# Patient Record
Sex: FEMALE | Race: WHITE | Employment: OTHER | URBAN - METROPOLITAN AREA
[De-identification: names, ages, dates, MRNs, and addresses within clinical notes are randomized per-mention and may not be internally consistent; named-entity substitution may affect disease eponyms.]

---

## 2018-07-19 ENCOUNTER — APPOINTMENT (OUTPATIENT)
Dept: GENERAL RADIOLOGY | Age: 58
DRG: 140 | End: 2018-07-19
Payer: MEDICARE

## 2018-07-19 ENCOUNTER — HOSPITAL ENCOUNTER (INPATIENT)
Age: 58
LOS: 2 days | Discharge: HOME | DRG: 140 | End: 2018-07-21
Attending: EMERGENCY MEDICINE | Admitting: INTERNAL MEDICINE
Payer: MEDICARE

## 2018-07-19 DIAGNOSIS — A41.9 SEPSIS, DUE TO UNSPECIFIED ORGANISM: ICD-10-CM

## 2018-07-19 DIAGNOSIS — R09.02 HYPOXIA: ICD-10-CM

## 2018-07-19 DIAGNOSIS — R06.00 DYSPNEA, UNSPECIFIED TYPE: ICD-10-CM

## 2018-07-19 DIAGNOSIS — J18.9 PNEUMONIA DUE TO ORGANISM: Primary | ICD-10-CM

## 2018-07-19 PROBLEM — J44.1 CHRONIC OBSTRUCTIVE PULMONARY DISEASE WITH ACUTE EXACERBATION (HCC): Status: ACTIVE | Noted: 2018-07-19

## 2018-07-19 PROBLEM — F17.200 SMOKER: Status: ACTIVE | Noted: 2018-07-19

## 2018-07-19 PROBLEM — G47.33 OSA (OBSTRUCTIVE SLEEP APNEA): Status: ACTIVE | Noted: 2018-07-19

## 2018-07-19 LAB
ALBUMIN SERPL-MCNC: 3.2 G/DL (ref 3.5–5.2)
ALBUMIN/GLOBULIN RATIO: 0.7 (ref 1–2.5)
ALLEN TEST: ABNORMAL
ALLEN TEST: POSITIVE
ALP BLD-CCNC: 139 U/L (ref 35–104)
ALT SERPL-CCNC: 19 U/L (ref 5–33)
ANION GAP SERPL CALCULATED.3IONS-SCNC: 13 MMOL/L (ref 9–17)
ANION GAP: 6 MMOL/L (ref 7–16)
AST SERPL-CCNC: 25 U/L
BILIRUB SERPL-MCNC: 0.47 MG/DL (ref 0.3–1.2)
BILIRUBIN DIRECT: 0.16 MG/DL
BILIRUBIN URINE: NEGATIVE
BILIRUBIN, INDIRECT: 0.31 MG/DL (ref 0–1)
BNP INTERPRETATION: ABNORMAL
BUN BLDV-MCNC: 10 MG/DL (ref 6–20)
BUN/CREAT BLD: ABNORMAL (ref 9–20)
CALCIUM SERPL-MCNC: 9.1 MG/DL (ref 8.6–10.4)
CHLORIDE BLD-SCNC: 97 MMOL/L (ref 98–107)
CO2: 27 MMOL/L (ref 20–31)
COLOR: YELLOW
COMMENT UA: ABNORMAL
CREAT SERPL-MCNC: 0.75 MG/DL (ref 0.5–0.9)
DIRECT EXAM: NORMAL
EKG ATRIAL RATE: 114 BPM
EKG P AXIS: 60 DEGREES
EKG P-R INTERVAL: 148 MS
EKG Q-T INTERVAL: 346 MS
EKG QRS DURATION: 78 MS
EKG QTC CALCULATION (BAZETT): 476 MS
EKG R AXIS: -23 DEGREES
EKG T AXIS: 72 DEGREES
EKG VENTRICULAR RATE: 114 BPM
FIO2: 6
FIO2: ABNORMAL
GFR AFRICAN AMERICAN: >60 ML/MIN
GFR NON-AFRICAN AMERICAN: >60 ML/MIN
GFR NON-AFRICAN AMERICAN: >60 ML/MIN
GFR SERPL CREATININE-BSD FRML MDRD: >60 ML/MIN
GFR SERPL CREATININE-BSD FRML MDRD: ABNORMAL ML/MIN/{1.73_M2}
GFR SERPL CREATININE-BSD FRML MDRD: ABNORMAL ML/MIN/{1.73_M2}
GFR SERPL CREATININE-BSD FRML MDRD: NORMAL ML/MIN/{1.73_M2}
GLOBULIN: ABNORMAL G/DL (ref 1.5–3.8)
GLUCOSE BLD-MCNC: 312 MG/DL (ref 70–99)
GLUCOSE BLD-MCNC: 331 MG/DL (ref 74–100)
GLUCOSE URINE: ABNORMAL
HCO3 VENOUS: 34.2 MMOL/L (ref 22–29)
HCT VFR BLD CALC: 44.6 % (ref 36.3–47.1)
HEMOGLOBIN: 13.8 G/DL (ref 11.9–15.1)
INR BLD: 0.9
KETONES, URINE: NEGATIVE
LEUKOCYTE ESTERASE, URINE: NEGATIVE
Lab: NORMAL
MCH RBC QN AUTO: 29.9 PG (ref 25.2–33.5)
MCHC RBC AUTO-ENTMCNC: 30.9 G/DL (ref 28.4–34.8)
MCV RBC AUTO: 96.5 FL (ref 82.6–102.9)
MODE: ABNORMAL
MODE: ABNORMAL
NEGATIVE BASE EXCESS, ART: ABNORMAL (ref 0–2)
NEGATIVE BASE EXCESS, VEN: ABNORMAL (ref 0–2)
NITRITE, URINE: NEGATIVE
NRBC AUTOMATED: 0.1 PER 100 WBC
O2 DEVICE/FLOW/%: ABNORMAL
O2 DEVICE/FLOW/%: ABNORMAL
O2 SAT, VEN: 67 % (ref 60–85)
PARTIAL THROMBOPLASTIN TIME: 29.8 SEC (ref 20.5–30.5)
PATIENT TEMP: ABNORMAL
PATIENT TEMP: ABNORMAL
PCO2, VEN: 58.8 MM HG (ref 41–51)
PDW BLD-RTO: 14.9 % (ref 11.8–14.4)
PH UA: 5 (ref 5–8)
PH VENOUS: 7.37 (ref 7.32–7.43)
PLATELET # BLD: 327 K/UL (ref 138–453)
PMV BLD AUTO: 11.2 FL (ref 8.1–13.5)
PO2, VEN: 37 MM HG (ref 30–50)
POC CHLORIDE: 98 MMOL/L (ref 98–107)
POC CREATININE: 0.87 MG/DL (ref 0.51–1.19)
POC HCO3: 28.6 MMOL/L (ref 21–28)
POC HEMATOCRIT: 48 % (ref 36–46)
POC HEMOGLOBIN: 16.4 G/DL (ref 12–16)
POC IONIZED CALCIUM: 1.16 MMOL/L (ref 1.15–1.33)
POC LACTIC ACID: 1.57 MMOL/L (ref 0.56–1.39)
POC O2 SATURATION: 92 % (ref 94–98)
POC PCO2 TEMP: ABNORMAL MM HG
POC PCO2 TEMP: ABNORMAL MM HG
POC PCO2: 58.2 MM HG (ref 35–48)
POC PH TEMP: ABNORMAL
POC PH TEMP: ABNORMAL
POC PH: 7.3 (ref 7.35–7.45)
POC PO2 TEMP: ABNORMAL MM HG
POC PO2 TEMP: ABNORMAL MM HG
POC PO2: 72.3 MM HG (ref 83–108)
POC POTASSIUM: 4 MMOL/L (ref 3.5–4.5)
POC SODIUM: 138 MMOL/L (ref 138–146)
POC TROPONIN I: 0 NG/ML (ref 0–0.1)
POC TROPONIN I: 0.02 NG/ML (ref 0–0.1)
POC TROPONIN INTERP: NORMAL
POC TROPONIN INTERP: NORMAL
POSITIVE BASE EXCESS, ART: 0 (ref 0–3)
POSITIVE BASE EXCESS, VEN: 7 (ref 0–3)
POTASSIUM SERPL-SCNC: 4.2 MMOL/L (ref 3.7–5.3)
PRO-BNP: 483 PG/ML
PROTEIN UA: NEGATIVE
PROTHROMBIN TIME: 10.1 SEC (ref 9–12)
RBC # BLD: 4.62 M/UL (ref 3.95–5.11)
SAMPLE SITE: ABNORMAL
SAMPLE SITE: ABNORMAL
SODIUM BLD-SCNC: 137 MMOL/L (ref 135–144)
SPECIFIC GRAVITY UA: 1.03 (ref 1–1.03)
SPECIMEN DESCRIPTION: NORMAL
STATUS: NORMAL
TCO2 (CALC), ART: 30 MMOL/L (ref 22–29)
TOTAL CO2, VENOUS: 36 MMOL/L (ref 23–30)
TOTAL PROTEIN: 8.1 G/DL (ref 6.4–8.3)
TROPONIN INTERP: NORMAL
TROPONIN INTERP: NORMAL
TROPONIN T: <0.03 NG/ML
TROPONIN T: <0.03 NG/ML
TURBIDITY: CLEAR
URINE HGB: NEGATIVE
UROBILINOGEN, URINE: NORMAL
WBC # BLD: 18.8 K/UL (ref 3.5–11.3)

## 2018-07-19 PROCEDURE — 2580000003 HC RX 258: Performed by: STUDENT IN AN ORGANIZED HEALTH CARE EDUCATION/TRAINING PROGRAM

## 2018-07-19 PROCEDURE — 94640 AIRWAY INHALATION TREATMENT: CPT

## 2018-07-19 PROCEDURE — 85730 THROMBOPLASTIN TIME PARTIAL: CPT

## 2018-07-19 PROCEDURE — 94660 CPAP INITIATION&MGMT: CPT

## 2018-07-19 PROCEDURE — 84132 ASSAY OF SERUM POTASSIUM: CPT

## 2018-07-19 PROCEDURE — 84295 ASSAY OF SERUM SODIUM: CPT

## 2018-07-19 PROCEDURE — 83880 ASSAY OF NATRIURETIC PEPTIDE: CPT

## 2018-07-19 PROCEDURE — 84484 ASSAY OF TROPONIN QUANT: CPT

## 2018-07-19 PROCEDURE — 6370000000 HC RX 637 (ALT 250 FOR IP): Performed by: STUDENT IN AN ORGANIZED HEALTH CARE EDUCATION/TRAINING PROGRAM

## 2018-07-19 PROCEDURE — 87449 NOS EACH ORGANISM AG IA: CPT

## 2018-07-19 PROCEDURE — 71046 X-RAY EXAM CHEST 2 VIEWS: CPT

## 2018-07-19 PROCEDURE — 80048 BASIC METABOLIC PNL TOTAL CA: CPT

## 2018-07-19 PROCEDURE — 83605 ASSAY OF LACTIC ACID: CPT

## 2018-07-19 PROCEDURE — 87899 AGENT NOS ASSAY W/OPTIC: CPT

## 2018-07-19 PROCEDURE — 36600 WITHDRAWAL OF ARTERIAL BLOOD: CPT

## 2018-07-19 PROCEDURE — 99406 BEHAV CHNG SMOKING 3-10 MIN: CPT

## 2018-07-19 PROCEDURE — 87493 C DIFF AMPLIFIED PROBE: CPT

## 2018-07-19 PROCEDURE — 82330 ASSAY OF CALCIUM: CPT

## 2018-07-19 PROCEDURE — 2060000000 HC ICU INTERMEDIATE R&B

## 2018-07-19 PROCEDURE — 80076 HEPATIC FUNCTION PANEL: CPT

## 2018-07-19 PROCEDURE — 87205 SMEAR GRAM STAIN: CPT

## 2018-07-19 PROCEDURE — 82947 ASSAY GLUCOSE BLOOD QUANT: CPT

## 2018-07-19 PROCEDURE — 87040 BLOOD CULTURE FOR BACTERIA: CPT

## 2018-07-19 PROCEDURE — 94664 DEMO&/EVAL PT USE INHALER: CPT

## 2018-07-19 PROCEDURE — 85014 HEMATOCRIT: CPT

## 2018-07-19 PROCEDURE — 82565 ASSAY OF CREATININE: CPT

## 2018-07-19 PROCEDURE — 96375 TX/PRO/DX INJ NEW DRUG ADDON: CPT

## 2018-07-19 PROCEDURE — 85610 PROTHROMBIN TIME: CPT

## 2018-07-19 PROCEDURE — 87086 URINE CULTURE/COLONY COUNT: CPT

## 2018-07-19 PROCEDURE — 99285 EMERGENCY DEPT VISIT HI MDM: CPT

## 2018-07-19 PROCEDURE — 99222 1ST HOSP IP/OBS MODERATE 55: CPT | Performed by: INTERNAL MEDICINE

## 2018-07-19 PROCEDURE — 86738 MYCOPLASMA ANTIBODY: CPT

## 2018-07-19 PROCEDURE — 82435 ASSAY OF BLOOD CHLORIDE: CPT

## 2018-07-19 PROCEDURE — 82803 BLOOD GASES ANY COMBINATION: CPT

## 2018-07-19 PROCEDURE — 93005 ELECTROCARDIOGRAM TRACING: CPT

## 2018-07-19 PROCEDURE — 6360000002 HC RX W HCPCS: Performed by: STUDENT IN AN ORGANIZED HEALTH CARE EDUCATION/TRAINING PROGRAM

## 2018-07-19 PROCEDURE — 71045 X-RAY EXAM CHEST 1 VIEW: CPT

## 2018-07-19 PROCEDURE — 81003 URINALYSIS AUTO W/O SCOPE: CPT

## 2018-07-19 PROCEDURE — 85027 COMPLETE CBC AUTOMATED: CPT

## 2018-07-19 PROCEDURE — S9441 ASTHMA EDUCATION: HCPCS

## 2018-07-19 PROCEDURE — 96374 THER/PROPH/DIAG INJ IV PUSH: CPT

## 2018-07-19 PROCEDURE — 6360000002 HC RX W HCPCS

## 2018-07-19 PROCEDURE — 36415 COLL VENOUS BLD VENIPUNCTURE: CPT

## 2018-07-19 RX ORDER — ALBUTEROL SULFATE 2.5 MG/3ML
2.5 SOLUTION RESPIRATORY (INHALATION)
Status: DISCONTINUED | OUTPATIENT
Start: 2018-07-19 | End: 2018-07-19

## 2018-07-19 RX ORDER — ALBUTEROL SULFATE 2.5 MG/3ML
2.5 SOLUTION RESPIRATORY (INHALATION) EVERY 6 HOURS PRN
Status: DISCONTINUED | OUTPATIENT
Start: 2018-07-19 | End: 2018-07-21 | Stop reason: HOSPADM

## 2018-07-19 RX ORDER — DULOXETIN HYDROCHLORIDE 30 MG/1
60 CAPSULE, DELAYED RELEASE ORAL DAILY
Status: DISCONTINUED | OUTPATIENT
Start: 2018-07-19 | End: 2018-07-21 | Stop reason: HOSPADM

## 2018-07-19 RX ORDER — SODIUM CHLORIDE 0.9 % (FLUSH) 0.9 %
10 SYRINGE (ML) INJECTION EVERY 12 HOURS SCHEDULED
Status: DISCONTINUED | OUTPATIENT
Start: 2018-07-19 | End: 2018-07-21 | Stop reason: HOSPADM

## 2018-07-19 RX ORDER — ONDANSETRON 2 MG/ML
4 INJECTION INTRAMUSCULAR; INTRAVENOUS ONCE
Status: COMPLETED | OUTPATIENT
Start: 2018-07-19 | End: 2018-07-19

## 2018-07-19 RX ORDER — LEVOFLOXACIN 5 MG/ML
750 INJECTION, SOLUTION INTRAVENOUS ONCE
Status: COMPLETED | OUTPATIENT
Start: 2018-07-19 | End: 2018-07-19

## 2018-07-19 RX ORDER — METHYLPREDNISOLONE SODIUM SUCCINATE 40 MG/ML
40 INJECTION, POWDER, LYOPHILIZED, FOR SOLUTION INTRAMUSCULAR; INTRAVENOUS EVERY 8 HOURS
Status: DISCONTINUED | OUTPATIENT
Start: 2018-07-19 | End: 2018-07-20

## 2018-07-19 RX ORDER — ALBUTEROL SULFATE 2.5 MG/3ML
2.5 SOLUTION RESPIRATORY (INHALATION) 2 TIMES DAILY
Status: DISCONTINUED | OUTPATIENT
Start: 2018-07-20 | End: 2018-07-21

## 2018-07-19 RX ORDER — SODIUM CHLORIDE 0.9 % (FLUSH) 0.9 %
10 SYRINGE (ML) INJECTION PRN
Status: DISCONTINUED | OUTPATIENT
Start: 2018-07-19 | End: 2018-07-21 | Stop reason: HOSPADM

## 2018-07-19 RX ORDER — ALBUTEROL SULFATE 2.5 MG/3ML
5 SOLUTION RESPIRATORY (INHALATION)
Status: DISCONTINUED | OUTPATIENT
Start: 2018-07-19 | End: 2018-07-19

## 2018-07-19 RX ORDER — ALBUTEROL SULFATE 90 UG/1
2 AEROSOL, METERED RESPIRATORY (INHALATION)
Status: DISCONTINUED | OUTPATIENT
Start: 2018-07-19 | End: 2018-07-19

## 2018-07-19 RX ORDER — 0.9 % SODIUM CHLORIDE 0.9 %
1000 INTRAVENOUS SOLUTION INTRAVENOUS ONCE
Status: COMPLETED | OUTPATIENT
Start: 2018-07-19 | End: 2018-07-19

## 2018-07-19 RX ORDER — IPRATROPIUM BROMIDE AND ALBUTEROL SULFATE 2.5; .5 MG/3ML; MG/3ML
1 SOLUTION RESPIRATORY (INHALATION) 4 TIMES DAILY
Status: DISCONTINUED | OUTPATIENT
Start: 2018-07-19 | End: 2018-07-21 | Stop reason: HOSPADM

## 2018-07-19 RX ORDER — METHYLPREDNISOLONE SODIUM SUCCINATE 125 MG/2ML
125 INJECTION, POWDER, LYOPHILIZED, FOR SOLUTION INTRAMUSCULAR; INTRAVENOUS ONCE
Status: COMPLETED | OUTPATIENT
Start: 2018-07-19 | End: 2018-07-19

## 2018-07-19 RX ORDER — ONDANSETRON 2 MG/ML
4 INJECTION INTRAMUSCULAR; INTRAVENOUS EVERY 6 HOURS PRN
Status: DISCONTINUED | OUTPATIENT
Start: 2018-07-19 | End: 2018-07-21 | Stop reason: HOSPADM

## 2018-07-19 RX ORDER — BUSPIRONE HYDROCHLORIDE 10 MG/1
10 TABLET ORAL 3 TIMES DAILY
Status: DISCONTINUED | OUTPATIENT
Start: 2018-07-19 | End: 2018-07-21 | Stop reason: HOSPADM

## 2018-07-19 RX ORDER — SODIUM CHLORIDE, SODIUM LACTATE, POTASSIUM CHLORIDE, AND CALCIUM CHLORIDE .6; .31; .03; .02 G/100ML; G/100ML; G/100ML; G/100ML
1000 INJECTION, SOLUTION INTRAVENOUS ONCE
Status: DISCONTINUED | OUTPATIENT
Start: 2018-07-19 | End: 2018-07-21 | Stop reason: HOSPADM

## 2018-07-19 RX ORDER — SODIUM CHLORIDE 9 MG/ML
INJECTION, SOLUTION INTRAVENOUS CONTINUOUS
Status: DISCONTINUED | OUTPATIENT
Start: 2018-07-19 | End: 2018-07-21 | Stop reason: HOSPADM

## 2018-07-19 RX ORDER — MORPHINE SULFATE 4 MG/ML
2 INJECTION, SOLUTION INTRAMUSCULAR; INTRAVENOUS ONCE
Status: COMPLETED | OUTPATIENT
Start: 2018-07-19 | End: 2018-07-19

## 2018-07-19 RX ORDER — IPRATROPIUM BROMIDE AND ALBUTEROL SULFATE 2.5; .5 MG/3ML; MG/3ML
1 SOLUTION RESPIRATORY (INHALATION)
Status: DISCONTINUED | OUTPATIENT
Start: 2018-07-19 | End: 2018-07-19

## 2018-07-19 RX ORDER — CELECOXIB 100 MG/1
200 CAPSULE ORAL DAILY
Status: DISCONTINUED | OUTPATIENT
Start: 2018-07-19 | End: 2018-07-21 | Stop reason: HOSPADM

## 2018-07-19 RX ORDER — OXYCODONE HYDROCHLORIDE 5 MG/1
5 TABLET ORAL EVERY 6 HOURS PRN
Status: DISCONTINUED | OUTPATIENT
Start: 2018-07-19 | End: 2018-07-21 | Stop reason: HOSPADM

## 2018-07-19 RX ORDER — ONDANSETRON 2 MG/ML
INJECTION INTRAMUSCULAR; INTRAVENOUS
Status: COMPLETED
Start: 2018-07-19 | End: 2018-07-19

## 2018-07-19 RX ORDER — ASPIRIN 81 MG/1
81 TABLET ORAL DAILY
Status: DISCONTINUED | OUTPATIENT
Start: 2018-07-19 | End: 2018-07-21 | Stop reason: HOSPADM

## 2018-07-19 RX ORDER — LEVOFLOXACIN 5 MG/ML
750 INJECTION, SOLUTION INTRAVENOUS EVERY 24 HOURS
Status: DISCONTINUED | OUTPATIENT
Start: 2018-07-20 | End: 2018-07-21

## 2018-07-19 RX ORDER — CHOLECALCIFEROL (VITAMIN D3) 125 MCG
250 CAPSULE ORAL DAILY
Status: DISCONTINUED | OUTPATIENT
Start: 2018-07-19 | End: 2018-07-21 | Stop reason: HOSPADM

## 2018-07-19 RX ORDER — ZOLPIDEM TARTRATE 5 MG/1
10 TABLET ORAL NIGHTLY PRN
Status: DISCONTINUED | OUTPATIENT
Start: 2018-07-19 | End: 2018-07-21 | Stop reason: HOSPADM

## 2018-07-19 RX ORDER — ACETAMINOPHEN 325 MG/1
650 TABLET ORAL EVERY 4 HOURS PRN
Status: DISCONTINUED | OUTPATIENT
Start: 2018-07-19 | End: 2018-07-21 | Stop reason: HOSPADM

## 2018-07-19 RX ORDER — MAGNESIUM SULFATE 1 G/100ML
INJECTION INTRAVENOUS
Status: COMPLETED
Start: 2018-07-19 | End: 2018-07-19

## 2018-07-19 RX ORDER — LEVOFLOXACIN 5 MG/ML
500 INJECTION, SOLUTION INTRAVENOUS ONCE
Status: DISCONTINUED | OUTPATIENT
Start: 2018-07-19 | End: 2018-07-19

## 2018-07-19 RX ADMIN — ONDANSETRON 4 MG: 2 INJECTION INTRAMUSCULAR; INTRAVENOUS at 10:39

## 2018-07-19 RX ADMIN — ENOXAPARIN SODIUM 40 MG: 100 INJECTION SUBCUTANEOUS at 14:10

## 2018-07-19 RX ADMIN — BUSPIRONE HYDROCHLORIDE 10 MG: 10 TABLET ORAL at 14:10

## 2018-07-19 RX ADMIN — MAGNESIUM SULFATE HEPTAHYDRATE 2 G: 1 INJECTION, SOLUTION INTRAVENOUS at 09:51

## 2018-07-19 RX ADMIN — SODIUM CHLORIDE 1000 ML: 9 INJECTION, SOLUTION INTRAVENOUS at 09:51

## 2018-07-19 RX ADMIN — IPRATROPIUM BROMIDE AND ALBUTEROL SULFATE 1 AMPULE: .5; 3 SOLUTION RESPIRATORY (INHALATION) at 15:11

## 2018-07-19 RX ADMIN — LEVOFLOXACIN 750 MG: 5 INJECTION, SOLUTION INTRAVENOUS at 12:25

## 2018-07-19 RX ADMIN — METHYLPREDNISOLONE SODIUM SUCCINATE 40 MG: 40 INJECTION, POWDER, FOR SOLUTION INTRAMUSCULAR; INTRAVENOUS at 18:20

## 2018-07-19 RX ADMIN — ALBUTEROL SULFATE 2.5 MG: 2.5 SOLUTION RESPIRATORY (INHALATION) at 23:31

## 2018-07-19 RX ADMIN — ASPIRIN 81 MG: 81 TABLET, COATED ORAL at 14:11

## 2018-07-19 RX ADMIN — ALBUTEROL SULFATE 5 MG: 5 SOLUTION RESPIRATORY (INHALATION) at 09:53

## 2018-07-19 RX ADMIN — BUSPIRONE HYDROCHLORIDE 10 MG: 10 TABLET ORAL at 20:57

## 2018-07-19 RX ADMIN — LEVOFLOXACIN 750 MG: 5 INJECTION, SOLUTION INTRAVENOUS at 23:55

## 2018-07-19 RX ADMIN — MORPHINE SULFATE 2 MG: 4 INJECTION, SOLUTION INTRAMUSCULAR; INTRAVENOUS at 10:34

## 2018-07-19 RX ADMIN — Medication 10 ML: at 20:57

## 2018-07-19 RX ADMIN — SODIUM CHLORIDE 1000 ML: 9 INJECTION, SOLUTION INTRAVENOUS at 13:41

## 2018-07-19 RX ADMIN — ZOLPIDEM TARTRATE 10 MG: 5 TABLET ORAL at 21:01

## 2018-07-19 RX ADMIN — IPRATROPIUM BROMIDE AND ALBUTEROL SULFATE 1 AMPULE: .5; 3 SOLUTION RESPIRATORY (INHALATION) at 20:19

## 2018-07-19 RX ADMIN — CYANOCOBALAMIN TAB 500 MCG 250 MCG: 500 TAB at 14:10

## 2018-07-19 RX ADMIN — METHYLPREDNISOLONE SODIUM SUCCINATE 125 MG: 125 INJECTION, POWDER, FOR SOLUTION INTRAMUSCULAR; INTRAVENOUS at 09:51

## 2018-07-19 RX ADMIN — ALBUTEROL SULFATE 5 MG: 5 SOLUTION RESPIRATORY (INHALATION) at 09:56

## 2018-07-19 RX ADMIN — OXYCODONE HYDROCHLORIDE 5 MG: 5 TABLET ORAL at 18:19

## 2018-07-19 RX ADMIN — DULOXETINE HYDROCHLORIDE 60 MG: 30 CAPSULE, DELAYED RELEASE ORAL at 14:10

## 2018-07-19 RX ADMIN — SODIUM CHLORIDE: 9 INJECTION, SOLUTION INTRAVENOUS at 13:39

## 2018-07-19 ASSESSMENT — PAIN SCALES - GENERAL
PAINLEVEL_OUTOF10: 5
PAINLEVEL_OUTOF10: 10

## 2018-07-19 ASSESSMENT — ENCOUNTER SYMPTOMS
CHEST TIGHTNESS: 1
VOMITING: 0
ABDOMINAL PAIN: 0
TROUBLE SWALLOWING: 0
CONSTIPATION: 0
RHINORRHEA: 0
DIARRHEA: 1
SHORTNESS OF BREATH: 1
NAUSEA: 0
COUGH: 1

## 2018-07-19 ASSESSMENT — PAIN DESCRIPTION - LOCATION: LOCATION: CHEST

## 2018-07-19 ASSESSMENT — PAIN DESCRIPTION - DESCRIPTORS: DESCRIPTORS: CONSTANT;DISCOMFORT

## 2018-07-19 ASSESSMENT — PAIN DESCRIPTION - PAIN TYPE: TYPE: ACUTE PAIN

## 2018-07-19 NOTE — ED PROVIDER NOTES
I performed a history and physical examination of the patient and discussed management with the resident. I reviewed the residents note and agree with the documented findings and plan of care. Any areas of disagreement are noted on the chart. I was personally present for the key portions of any procedures. I have documented in the chart those procedures where I was not present during the key portions. I have reviewed the emergency nurses triage note. I agree with the chief complaint, past medical history, past surgical history, allergies, medications, social and family history as documented unless otherwise noted below. Documentation of the HPI, Physical Exam and Medical Decision Making performed by medical students or scribes is based on my personal performance of the HPI, PE and MDM. For Phys Assistant/ Nurse Practitioner cases/documentation I have personally evaluated this patient and have completed at least one if not all key elements of the E/M (history, physical exam, and MDM). Additional findings are as noted. Patient returns with an asthma exacerbation. Has been going on for the past week however has worsened today. Brought in by her daughter. Patient does have significant diffuse wheezing. His hypoxemic on arrival.  Starting to feel better after initial treatments. Patient placed on BiPAP and seems to be improving. Plan will be to admit the patient for further therapy. 10:38 AM:   Patient's lactic acid is elevated. Continue with therapy. Chest x-ray shows questionable vascular congestion. Clinically I feel this is asthma. She does have some hyperglycemia. Troponin negative. Interpreted by Jackie Boyd DO     Rhythm: Sinus tachycardia  Rate: 114  Axis:  left  Ectopy: none  Conduction: normal  ST Segments: no acute change  T Waves: no acute change    Clinical Impression: sinus rhythm with no acute changes. Nonspecific EKG. No acute infarction/ischemia noted.     Critical

## 2018-07-19 NOTE — ED PROVIDER NOTES
[facility]  ED  Emergency Department Encounter  Emergency Medicine Resident     Pt Name: Kevin Feldman  MRN: [id number]  [place] 1960  Date of evaluation: 7/19/18  PCP:  No primary care provider on file. CHIEF COMPLAINT       Chief Complaint   Patient presents with    Shortness of Breath     complaining of shortness of breath with productive cough    Chest Pain     complaining of generalized chest pain increased with cough        HISTORY OF PRESENT ILLNESS  (Location/Symptom, Timing/Onset, Context/Setting, Quality, Duration, Modifying Factors, Severity.)      Kevin Feldman is a 62 y.o. female, history COPD, presents with shortness of breath for one day duration. Shortness of breath is constant, associated with chest pain, unable to take her Spiriva. Patient has had a cough for 1 month. For the past 3 days, patient had charcoal color productive sputum, felt feverish, watery diarrhea, and son noticed patient was 'out of it'. Denies nausea, vomiting, headache, syncope. PAST MEDICAL / SURGICAL / SOCIAL / FAMILY HISTORY      has a past medical history of Anxiety; Depression; Diabetes mellitus ([address] DJD (degenerative joint disease); Hyperlipidemia; Hypertension; Lung disease; and Type II or unspecified type diabetes mellitus without mention of complication, not stated as uncontrolled. has a past surgical history that includes Tonsillectomy (1980); partial hysterectomy (cervix not removed) (1988); Foot surgery (1985-87); Abdominal exploration surgery; and Hand surgery (Bilateral). Social History     Social History    Marital status:      Spouse name: N/A    Number of children: N/A    Years of education: N/A     Occupational History    Not on file.      Social History Main Topics    Smoking status: Current Every Day Smoker     Packs/day: 1.50     Years: 30.00     Types: Cigarettes    Smokeless tobacco: Never Used    Alcohol use No      Comment: social    Drug Respiratory: Positive for cough, chest tightness and shortness of breath. Cough productive of charcoal sputum   Cardiovascular: Positive for chest pain. Negative for palpitations and leg swelling. Gastrointestinal: Positive for diarrhea. Negative for abdominal pain, constipation, nausea and vomiting. Endocrine: Negative for polyuria. Genitourinary: Negative for decreased urine volume, difficulty urinating and dysuria. Skin: Positive for pallor. Neurological: Positive for weakness. Negative for syncope, light-headedness and headaches. Psychiatric/Behavioral:        Somnolent - as per son       PHYSICAL EXAM   (up to 7 for level 4, 8 or more for level 5)      INITIAL VITALS:   BP (!) 106/59   Pulse 108   Temp 98.3 °F (36.8 °C) (Oral)   Resp 23   Wt 260 lb (117.9 kg)   SpO2 92%   BMI 40.72 kg/m²     Physical Exam   Constitutional: She appears well-developed and well-nourished. She appears distressed. HENT:   Head: Normocephalic and atraumatic. Cyanotic lips   Neck: Neck supple. Cardiovascular: Regular rhythm and normal heart sounds. tachycardic   Pulmonary/Chest: She is in respiratory distress. She has wheezes. She has no rales. She exhibits no tenderness. Abdominal: Soft. Bowel sounds are normal.   Musculoskeletal: She exhibits no edema. Lymphadenopathy:     She has no cervical adenopathy. Neurological: She is alert. Skin: Skin is warm and dry.        DIFFERENTIAL  DIAGNOSIS     PLAN (LABS / IMAGING / EKG):  Orders Placed This Encounter   Procedures    CULTURE BLOOD #1    CULTURE BLOOD #2    URINE CULTURE    XR CHEST PORTABLE    XR CHEST STANDARD (2 VW)    Basic Metabolic Panel    CBC    Hemoglobin and hematocrit, blood    SODIUM (POC)    POTASSIUM (POC)    CHLORIDE (POC)    CALCIUM, IONIC (POC)    Brain Natriuretic Peptide    URINALYSIS    Hepatic Function Panel    Protime-INR    APTT    Inpatient consult to Internal Medicine   [facility] ED Aerosol Protocol    Respiratory care evaluation only    HHN Treatment    MDI Treatment    BIPAP    POCT troponin    Venous Blood Gas, POC    Creatinine W/GFR Point of Care    Lactic Acid, POC    POCT Glucose    Anion Gap (Calc) POC    POCT troponin    POCT troponin    EKG 12 Lead    PATIENT STATUS (FROM ED OR OR/PROCEDURAL) Inpatient       MEDICATIONS ORDERED:  Orders Placed This Encounter   Medications    albuterol (PROVENTIL) nebulizer solution 5 mg    DISCONTD: ipratropium-albuterol (DUONEB) nebulizer solution 1 ampule    DISCONTD: albuterol (PROVENTIL) nebulizer solution 5 mg    albuterol sulfate  (90 Base) MCG/ACT inhaler 2 puff    DISCONTD: albuterol sulfate  (90 Base) MCG/ACT inhaler 2 puff    DISCONTD: ipratropium (ATROVENT HFA) 17 MCG/ACT inhaler 2 puff    ipratropium (ATROVENT) 0.02 % nebulizer solution 0.5 mg    methylPREDNISolone sodium (SOLU-MEDROL) injection 125 mg    0.9 % sodium chloride bolus    magnesium sulfate 2 g in dextrose 5 % 100 mL IVPB    magnesium sulfate 1-5 GM/100ML-% IVPB (premix)     NADINE MAHONEY: cabinet override    morphine (PF) injection 2 mg    ondansetron (ZOFRAN) 4 MG/2ML injection     NASEEM MERIDA: cabinet override    ondansetron (ZOFRAN) injection 4 mg    lactated ringers bolus    DISCONTD: levofloxacin (LEVAQUIN) 500 MG/100ML infusion 500 mg    levofloxacin (LEVAQUIN) 750 MG/150ML infusion 750 mg       DDX: Pneumonia  Sepsis due to pneumonia   Sepsis due to unknown cause   COPD exacerbation     DIAGNOSTIC RESULTS / EMERGENCY DEPARTMENT COURSE / MDM     LABS:  Results for orders placed or performed during the hospital encounter of 90/16/66   Basic Metabolic Panel   Result Value Ref Range    Glucose 312 (H) 70 - 99 mg/dL    BUN 10 6 - 20 mg/dL    CREATININE 0.75 0.50 - 0.90 mg/dL    Bun/Cre Ratio NOT REPORTED 9 - 20    Calcium 9.1 8.6 - 10.4 mg/dL    Sodium 137 135 - 144 mmol/L    Potassium 4.2 3.7 - 5.3 mmol/L    Chloride 97 (L) 98 - 107 mmol/L    CO2 27 20 - 31 mmol/L    Anion Gap 13 9 - 17 mmol/L    GFR Non-African American >60 >60 mL/min    GFR African American >60 >60 mL/min    GFR Comment          GFR Staging NOT REPORTED    CBC   Result Value Ref Range    WBC 18.8 (H) 3.5 - 11.3 k/uL    RBC 4.62 3.95 - 5.11 m/uL    Hemoglobin 13.8 11.9 - 15.1 g/dL    Hematocrit 44.6 36.3 - 47.1 %    MCV 96.5 82.6 - 102.9 fL    MCH 29.9 25.2 - 33.5 pg    MCHC 30.9 28.4 - 34.8 g/dL    RDW 14.9 (H) 11.8 - 14.4 %    Platelets 254 752 - 518 k/uL    MPV 11.2 8.1 - 13.5 fL    NRBC Automated 0.1 (H) 0.0 per 100 WBC   Hemoglobin and hematocrit, blood   Result Value Ref Range    POC Hemoglobin 16.4 (H) 12.0 - 16.0 g/dL    POC Hematocrit 48 (H) 36 - 46 %   SODIUM (POC)   Result Value Ref Range    POC Sodium 138 138 - 146 mmol/L   POTASSIUM (POC)   Result Value Ref Range    POC Potassium 4.0 3.5 - 4.5 mmol/L   CHLORIDE (POC)   Result Value Ref Range    POC Chloride 98 98 - 107 mmol/L   CALCIUM, IONIC (POC)   Result Value Ref Range    POC Ionized Calcium 1.16 1.15 - 1.33 mmol/L   Brain Natriuretic Peptide   Result Value Ref Range    Pro- (H) <300 pg/mL    BNP Interpretation         Venous Blood Gas, POC   Result Value Ref Range    pH, Omid 7.372 7.320 - 7.430    pCO2, Omid 58.8 (H) 41.0 - 51.0 mm Hg    pO2, Omid 37.0 30.0 - 50.0 mm Hg    HCO3, Venous 34.2 (H) 22.0 - 29.0 mmol/L    Total CO2, Venous 36 (H) 23.0 - 30.0 mmol/L    Negative Base Excess, Omid NOT REPORTED 0.0 - 2.0    Positive Base Excess, Omid 7 (H) 0.0 - 3.0    O2 Sat, Omid 67 60.0 - 85.0 %    O2 Device/Flow/% NOT REPORTED     Chava Test NOT REPORTED     Sample Site NOT REPORTED     Mode NOT REPORTED     FIO2 NOT REPORTED     Pt Temp NOT REPORTED     POC pH Temp NOT REPORTED     POC pCO2 Temp NOT REPORTED mm Hg    POC pO2 Temp NOT REPORTED mm Hg   Creatinine W/GFR Point of Care   Result Value Ref Range    POC Creatinine 0.87 0.51 - 1.19 mg/dL    GFR Comment >60 >60 mL/min    GFR Non-African American >60 >60 mL/min    GFR Comment         Lactic Acid, POC   Result Value Ref Range    POC Lactic Acid 1.57 (H) 0.56 - 1.39 mmol/L   POCT Glucose   Result Value Ref Range    POC Glucose 331 (H) 74 - 100 mg/dL   Anion Gap (Calc) POC   Result Value Ref Range    Anion Gap 6 (L) 7 - 16 mmol/L   POCT troponin   Result Value Ref Range    POC Troponin I 0.00 0.00 - 0.10 ng/mL    POC Troponin Interp       The Troponin-I (POC) results cannot be compared to the Troponin-T results. POCT troponin   Result Value Ref Range    POC Troponin I 0.02 0.00 - 0.10 ng/mL    POC Troponin Interp       The Troponin-I (POC) results cannot be compared to the Troponin-T results. IMPRESSION: 9year-old female presents to the ED with shortness of breath for 1 day duration. Cardiac workup negative. Results positive for compensated respiratory acidosis, SIRS criteria met-tachycardic, tachypnea, elevated WBC. Diagnosis of sepsis from possible pneumonia source. RADIOLOGY:  Xr Chest Portable    Result Date: 7/19/2018  EXAMINATION: SINGLE XRAY VIEW OF THE CHEST 7/19/2018 10:05 am COMPARISON: 11/25/2016 HISTORY: ORDERING SYSTEM PROVIDED HISTORY: chest pain TECHNOLOGIST PROVIDED HISTORY: Reason for exam:->chest pain FINDINGS: No focal consolidation in the chest.  The heart is not enlarged. The appearance of mild vascular congestion may partially attributable to body habitus and portable technique. There is no pleural effusion or pneumothorax. Please correlate with patient symptoms to differentiate artifact from true, mild pulmonary vascular congestion. EKG  None    All EKG's are interpreted by the Emergency Department Physician who either signs or Co-signs this chart in the absence of a cardiologist.    EMERGENCY DEPARTMENT COURSE:  59-year-old female presents for shortness of breath, duration.   On arrival patient's O2 sat is 76%, patient was given nasal cannula 4 L of oxygen and respiratory therapy gave back-to-back nebulizers and BiPAP. O2 sat consideration increase to 92%. Patient reported improvement of breathing. CBC, BMP, BNP, chest x-ray, troponin ×2, and sepsis workup completed. Patient received IV fluids and analgesics. Based on results, decision to admit. Levaquin started. Medicine consulted and decision to admit to stepdown. Sepsis Times and Checklist  Vital Signs: BP: (!) 106/59  Pulse: 108  Resp: 23  Temp: 98.3 °F (36.8 °C) SpO2: 92 %  SIRS (>2)   Temp > 38.3C or < 36C   HR > 90   RR > 20   WBC > 12 or < 4 or >10% bands  SIRS (>2) and confirmed or suspected source of infection = Sepsis  Is Sepsis due to likely bacterial infection?: Yes       Sepsis Identified   Date: 7/19/2018  Time: 12:18pm   Sepsis Orders:  ·  CBC: Yes  ·  CMP: Yes  ·  PT/PTT: Yes  ·  Blood Cultures x2: Yes  ·  Urinalysis and Urine Culture: Yes  ·  Lactate: Yes  ·  Broad Spectrum Antibiotics Given (within 3 hours of sepsis identification,  after blood cultures):  Yes    (If unable to obtain IV access for IV antibiotics within 3 hours of  identification of sepsis, IM antibiotics is acceptable.)  ·             If lactate >2.0 MUST repeat within 6 hours         PROCEDURES:  Procedures    CONSULTS:  IP CONSULT TO INTERNAL MEDICINE    CRITICAL CARE:  None    FINAL IMPRESSION      1. Pneumonia due to organism    2. Sepsis, due to unspecified organism ([address]    3. Dyspnea, unspecified type    4. Hypoxia          DISPOSITION / PLAN     DISPOSITION Admitted 07/19/2018 12:50:09 PM      PATIENT REFERRED TO:  No follow-up provider specified.     DISCHARGE MEDICATIONS:  New Prescriptions    No medications on file       Shawnee Nam MD  Family Medicine Resident    (Please note that portions of this note were completed with a voice recognition program.  Efforts were made to edit the dictations but occasionally words are mis-transcribed.)       Shawnee Nam MD  Resident  07/19/18 Yane Burt MD  Resident  07/19/18 [phone]

## 2018-07-19 NOTE — PLAN OF CARE
Problem: RESPIRATORY  Intervention: Administer treatments as ordered  BRONCHOSPASM/BRONCHOCONSTRICTION     [x]         IMPROVE AERATION/BREATH SOUNDS  [x]   ADMINISTER BRONCHODILATOR THERAPY AS APPROPRIATE  [x]   ASSESS BREATH SOUNDS  [x]   IMPLEMENT AEROSOL/MDI PROTOCOL  [x]   PATIENT EDUCATION AS NEEDED      Intervention: Education, Medication and treatments    AIDET method used to introduce myself to patient and or parent in room    Patient and or Parent educated on current respiratory medication and modalities   administered. Possible side effects of medications discussed. Questions answered. Patient and or Parent accepts Daily POC and treatment plan. Intervention: Respiratory assessment  JORGE RIVASPPatient Assessment complete. Sepsis ([facility] [address] [A41.9]  Sepsis ([facility] [address] [A41.9] . Vitals:    07/19/18 1441   BP:    Pulse:    Resp: 22   Temp:    SpO2: 93%   . Patients home meds are   Prior to Admission medications    Medication Sig Start Date End Date Taking?  Authorizing Provider   DULoxetine (CYMBALTA) 60 MG extended release capsule Take 120 mg by mouth daily    Historical Provider, MD   lisinopril (PRINIVIL;ZESTRIL) 5 MG tablet Take 5 mg by mouth daily    Historical Provider, MD   aspirin 81 MG tablet Take 81 mg by mouth daily    Historical Provider, MD   glimepiride (AMARYL) 1 MG tablet Take 1 mg by mouth every morning (before breakfast)    Historical Provider, MD   busPIRone (BUSPAR) 10 MG tablet Take 10 mg by mouth 3 times daily    Historical Provider, MD   vitamin B-12 (CYANOCOBALAMIN) 100 MCG tablet Take 250 mcg by mouth daily    Historical Provider, MD   HYDROcodone-acetaminophen (NORCO)  MG per tablet Take 1 tablet by mouth every 6 hours as needed for Pain    Historical Provider, MD   meloxicam (MOBIC) 15 MG tablet take 1 tablet by mouth once daily if needed 12/12/14   Wilma Gatica DO   JANUVIA 100 MG tablet take 1 tablet by mouth once daily 4/25/14   Umm Saleh MD   zolpidem 15ml/Kg   Surgery within last 2 weeks []  None or general   []  Abdominal or thoracic surgery  []  Abdominal or thoracic   Chronic Pulmonary Historyre []  No []  Yes []  Yes     []  Secretion Management Assessment  Score 1 2 3   Bilateral Breath Sounds   []  Occasional Rhonchi []  Scattered Rhonchi []  Course Rhonchi and/or poor aeration   Sputum    []  Small amount of thin secretions []  Moderate amount of viscous secretions []  Copius, Viscious Yellow/ Secretions   CXR as reported by physician []  clear  []  Unavailable []  Infiltrates and/or consolidation  []  Unavailable []  Mucus Plugging and or lobar consolidation  []  Unavailable   Cough []  Strong, productive cough []  Weak productive cough []  No cough or weak non-productive cough   RANDY SANDOVAL  2:46 PM                            FEMALE                                  MALE                            FEV1 Predicted Normal Values                        FEV1 Predicted Normal Values          Age                                     Height in Feet and Inches       Age                                     Height in Feet and Inches       4' 11\" 5' 1\" 5' 3\" 5' 5\" 5' 7\" 5' 9\" 5' 11\" 6' 1\"  4' 11\" 5' 1\" 5' 3\" 5' 5\" 5' 7\" 5' 9\" 5' 11\" 6' 1\"   42 - 45 2.49 2.66 2.84 3.03 3.22 3.42 3.62 3.83 42 - 45 2.82 3.03 3.26 3.49 3.72 3.96 4.22 4.47   46 - 49 2.40 2.57 2.76 2.94 3.14 3.33 3.54 3.75 46 - 49 2.70 2.92 3.14 3.37 3.61 3.85 4.10 4.36   50 - 53 2.31 2.48 2.66 2.85 3.04 3.24 3.45 3.66 50 - 53 2.58 2.80 3.02 3.25 3.49 3.73 3.98 4.24   54 - 57 2.21 2.38 2.57 2.75 2.95 3.14 3.35 3.56 54 - 57 2.46 2.67 2.89 3.12 3.36 3.60 3.85 4.11   58 - 61 2.10 2.28 2.46 2.65 2.84 3.04 3.24 3.45 58 - 61 2.32 2.54 2.76 2.99 3.23 3.47 3.72 3.98   62 - 65 1.99 2.17 2.35 2.54 2.73 2.93 3.13 3.34 62 - 65 2.19 2.40 2.62 2.85 3.09 3.33 3.58 3.84   66 - 69 1.88 2.05 2.23 2.42 2.61 2.81 3.02 3.23 66 - 69 2.04 2.26 2.48 2.71 2.95 3.19 3.44 3.70   70+ 1.82 1.99 2.17 2.36 2.55 2.75 2.95 3.16 70+ 1.97

## 2018-07-19 NOTE — CARE COORDINATION
Case Management Initial Discharge Plan  Leo Best,         Readmission Risk              Risk of Unplanned Readmission:        8               Met with:patient to discuss discharge plans. Information verified: address, contacts, phone number, , insurance Yes  PCP: Dr. Brittany Walton  Date of last visit: 2018    Insurance Provider: [employer]    Discharge Planning    Living Arrangements:  Family Members   Support Systems:  Family Members, Jennifer Georges has 1 stories  0 stairs to climb to get into front door  Location of bedroom/bathroom in home main floor    Patient able to perform ADL's:Independent    Current Services (outpatient & in home) none  DME equipment: none  DME provider: n/a    Pharmacy: [facility] [address] Medications:  No  Does patient want to participate in local refill/ meds to beds program?  No    Potential Assistance Needed:  N/A    Patient agreeable to home care: No  [facility] of choice provided:  n/a    Prior SNF/Rehab Placement and Facility: no  Agreeable to SNF/Rehab: No  P[facility] of choice provided: n/a   Evaluation: no    Expected Discharge date:  18  Patient expects to be discharged to:  Home  Follow Up Appointment: Best Day/ Time: Monday AM    Transportation provider: self, family  Transportation arrangements needed for discharge: No     Discharge Plan: Met with patient to discuss transitional planning. Patient plans to return to home with family support. Will follow for home needs.         Electronically signed by Flavio Davis RN on 18 at 5:48 PM

## 2018-07-19 NOTE — H&P
Historical Provider, MD   lisinopril (PRINIVIL;ZESTRIL) 5 MG tablet Take 5 mg by mouth daily    Historical Provider, MD   aspirin 81 MG tablet Take 81 mg by mouth daily    Historical Provider, MD   glimepiride (AMARYL) 1 MG tablet Take 1 mg by mouth every morning (before breakfast)    Historical Provider, MD   busPIRone (BUSPAR) 10 MG tablet Take 10 mg by mouth 3 times daily    Historical Provider, MD   vitamin B-12 (CYANOCOBALAMIN) 100 MCG tablet Take 250 mcg by mouth daily    Historical Provider, MD   HYDROcodone-acetaminophen (NORCO)  MG per tablet Take 1 tablet by mouth every 6 hours as needed for Pain    Historical Provider, MD   meloxicam (MOBIC) 15 MG tablet take 1 tablet by mouth once daily if needed 12/12/14   Wilma Gatica DO   JANUVIA 100 MG tablet take 1 tablet by mouth once daily 4/25/14   Gilbert Lin MD   zolpidem (AMBIEN) 10 MG tablet Take 10 mg by mouth nightly as needed. Historical Provider, MD   celecoxib (CELEBREX) 200 MG capsule Take 1 capsule by mouth daily. 5/9/13   Wilma Gatica DO   tizanidine (ZANAFLEX) 4 MG tablet Take 4 mg by mouth every 6 hours as needed. Takes prn    Historical Provider, MD       ALLERGIES      Patient has no known allergies. SOCIAL HISTORY       reports that she has been smoking Cigarettes. She has a 45.00 pack-year smoking history. She has never used smokeless tobacco. She reports that she does not drink alcohol or use drugs. FAMILY HISTORY      family history includes Diabetes in her mother; Heart Disease in her father and mother. REVIEW OF SYSTEMS      · Constitutional: Negative for weight loss  · Eyes: Negative for visual changes, diplopia, scleral icterus. · ENT: Negative for Headaches, hearing loss, vertigo, mouth sores, sore throat. · Cardiovascular: Negative for lightheadedness/orthostatic symptoms ,chest pain, Positive for dyspnea on exertion, palpitations or loss of consciousness.    · RespiratoryPositive or cough or wheezing,

## 2018-07-20 ENCOUNTER — APPOINTMENT (OUTPATIENT)
Dept: GENERAL RADIOLOGY | Age: 58
DRG: 140 | End: 2018-07-20
Payer: MEDICARE

## 2018-07-20 LAB
ABSOLUTE EOS #: <0.03 K/UL (ref 0–0.44)
ABSOLUTE IMMATURE GRANULOCYTE: 0.27 K/UL (ref 0–0.3)
ABSOLUTE LYMPH #: 0.98 K/UL (ref 1.1–3.7)
ABSOLUTE MONO #: 0.65 K/UL (ref 0.1–1.2)
ANION GAP SERPL CALCULATED.3IONS-SCNC: 15 MMOL/L (ref 9–17)
BASOPHILS # BLD: 0 % (ref 0–2)
BASOPHILS ABSOLUTE: 0.04 K/UL (ref 0–0.2)
BUN BLDV-MCNC: 21 MG/DL (ref 6–20)
BUN/CREAT BLD: ABNORMAL (ref 9–20)
CALCIUM SERPL-MCNC: 8.7 MG/DL (ref 8.6–10.4)
CHLORIDE BLD-SCNC: 99 MMOL/L (ref 98–107)
CO2: 23 MMOL/L (ref 20–31)
CREAT SERPL-MCNC: 0.95 MG/DL (ref 0.5–0.9)
CULTURE: NORMAL
CULTURE: NORMAL
DIFFERENTIAL TYPE: ABNORMAL
EOSINOPHILS RELATIVE PERCENT: 0 % (ref 1–4)
GFR AFRICAN AMERICAN: >60 ML/MIN
GFR NON-AFRICAN AMERICAN: >60 ML/MIN
GFR SERPL CREATININE-BSD FRML MDRD: ABNORMAL ML/MIN/{1.73_M2}
GFR SERPL CREATININE-BSD FRML MDRD: ABNORMAL ML/MIN/{1.73_M2}
GLUCOSE BLD-MCNC: 357 MG/DL (ref 70–99)
HCT VFR BLD CALC: 39.2 % (ref 36.3–47.1)
HEMOGLOBIN: 11.9 G/DL (ref 11.9–15.1)
IMMATURE GRANULOCYTES: 2 %
LV EF: 55 %
LVEF MODALITY: NORMAL
LYMPHOCYTES # BLD: 6 % (ref 24–43)
Lab: NORMAL
Lab: NORMAL
MCH RBC QN AUTO: 30.1 PG (ref 25.2–33.5)
MCHC RBC AUTO-ENTMCNC: 30.4 G/DL (ref 28.4–34.8)
MCV RBC AUTO: 99 FL (ref 82.6–102.9)
MONOCYTES # BLD: 4 % (ref 3–12)
MYCOPLASMA PNEUMONIAE IGM: 0.68
NRBC AUTOMATED: 0.2 PER 100 WBC
PDW BLD-RTO: 14.8 % (ref 11.8–14.4)
PLATELET # BLD: 311 K/UL (ref 138–453)
PLATELET ESTIMATE: ABNORMAL
PMV BLD AUTO: 11.9 FL (ref 8.1–13.5)
POTASSIUM SERPL-SCNC: 4.5 MMOL/L (ref 3.7–5.3)
PROCALCITONIN: 0.11 NG/ML
RBC # BLD: 3.96 M/UL (ref 3.95–5.11)
RBC # BLD: ABNORMAL 10*6/UL
SEG NEUTROPHILS: 88 % (ref 36–65)
SEGMENTED NEUTROPHILS ABSOLUTE COUNT: 13.33 K/UL (ref 1.5–8.1)
SODIUM BLD-SCNC: 137 MMOL/L (ref 135–144)
SPECIMEN DESCRIPTION: NORMAL
SPECIMEN DESCRIPTION: NORMAL
STATUS: NORMAL
STATUS: NORMAL
TROPONIN INTERP: NORMAL
TROPONIN T: <0.03 NG/ML
WBC # BLD: 15.3 K/UL (ref 3.5–11.3)
WBC # BLD: ABNORMAL 10*3/UL

## 2018-07-20 PROCEDURE — 2060000000 HC ICU INTERMEDIATE R&B

## 2018-07-20 PROCEDURE — 84484 ASSAY OF TROPONIN QUANT: CPT

## 2018-07-20 PROCEDURE — 94762 N-INVAS EAR/PLS OXIMTRY CONT: CPT

## 2018-07-20 PROCEDURE — 85025 COMPLETE CBC W/AUTO DIFF WBC: CPT

## 2018-07-20 PROCEDURE — 6370000000 HC RX 637 (ALT 250 FOR IP): Performed by: HOSPITALIST

## 2018-07-20 PROCEDURE — 6360000002 HC RX W HCPCS: Performed by: STUDENT IN AN ORGANIZED HEALTH CARE EDUCATION/TRAINING PROGRAM

## 2018-07-20 PROCEDURE — 71046 X-RAY EXAM CHEST 2 VIEWS: CPT

## 2018-07-20 PROCEDURE — 6370000000 HC RX 637 (ALT 250 FOR IP): Performed by: STUDENT IN AN ORGANIZED HEALTH CARE EDUCATION/TRAINING PROGRAM

## 2018-07-20 PROCEDURE — 36415 COLL VENOUS BLD VENIPUNCTURE: CPT

## 2018-07-20 PROCEDURE — 80048 BASIC METABOLIC PNL TOTAL CA: CPT

## 2018-07-20 PROCEDURE — 2580000003 HC RX 258: Performed by: STUDENT IN AN ORGANIZED HEALTH CARE EDUCATION/TRAINING PROGRAM

## 2018-07-20 PROCEDURE — 94640 AIRWAY INHALATION TREATMENT: CPT

## 2018-07-20 PROCEDURE — 94660 CPAP INITIATION&MGMT: CPT

## 2018-07-20 PROCEDURE — 99233 SBSQ HOSP IP/OBS HIGH 50: CPT | Performed by: INTERNAL MEDICINE

## 2018-07-20 PROCEDURE — 84145 PROCALCITONIN (PCT): CPT

## 2018-07-20 PROCEDURE — 93306 TTE W/DOPPLER COMPLETE: CPT

## 2018-07-20 RX ORDER — PREDNISONE 20 MG/1
40 TABLET ORAL DAILY
Status: DISCONTINUED | OUTPATIENT
Start: 2018-07-20 | End: 2018-07-21 | Stop reason: HOSPADM

## 2018-07-20 RX ADMIN — BUSPIRONE HYDROCHLORIDE 10 MG: 10 TABLET ORAL at 09:41

## 2018-07-20 RX ADMIN — ALBUTEROL SULFATE 2.5 MG: 2.5 SOLUTION RESPIRATORY (INHALATION) at 23:41

## 2018-07-20 RX ADMIN — ZOLPIDEM TARTRATE 10 MG: 5 TABLET ORAL at 21:15

## 2018-07-20 RX ADMIN — PREDNISONE 40 MG: 20 TABLET ORAL at 17:30

## 2018-07-20 RX ADMIN — ALBUTEROL SULFATE 2.5 MG: 2.5 SOLUTION RESPIRATORY (INHALATION) at 03:40

## 2018-07-20 RX ADMIN — ENOXAPARIN SODIUM 40 MG: 100 INJECTION SUBCUTANEOUS at 09:41

## 2018-07-20 RX ADMIN — DULOXETINE HYDROCHLORIDE 60 MG: 30 CAPSULE, DELAYED RELEASE ORAL at 09:41

## 2018-07-20 RX ADMIN — BUSPIRONE HYDROCHLORIDE 10 MG: 10 TABLET ORAL at 21:15

## 2018-07-20 RX ADMIN — SODIUM CHLORIDE: 9 INJECTION, SOLUTION INTRAVENOUS at 03:09

## 2018-07-20 RX ADMIN — Medication 10 ML: at 21:15

## 2018-07-20 RX ADMIN — IPRATROPIUM BROMIDE AND ALBUTEROL SULFATE 1 AMPULE: .5; 3 SOLUTION RESPIRATORY (INHALATION) at 19:50

## 2018-07-20 RX ADMIN — CYANOCOBALAMIN TAB 500 MCG 250 MCG: 500 TAB at 09:41

## 2018-07-20 RX ADMIN — BUSPIRONE HYDROCHLORIDE 10 MG: 10 TABLET ORAL at 14:13

## 2018-07-20 RX ADMIN — IPRATROPIUM BROMIDE AND ALBUTEROL SULFATE 1 AMPULE: .5; 3 SOLUTION RESPIRATORY (INHALATION) at 16:07

## 2018-07-20 RX ADMIN — OXYCODONE HYDROCHLORIDE 5 MG: 5 TABLET ORAL at 09:41

## 2018-07-20 RX ADMIN — METHYLPREDNISOLONE SODIUM SUCCINATE 40 MG: 40 INJECTION, POWDER, FOR SOLUTION INTRAMUSCULAR; INTRAVENOUS at 02:15

## 2018-07-20 RX ADMIN — ASPIRIN 81 MG: 81 TABLET, COATED ORAL at 09:41

## 2018-07-20 RX ADMIN — Medication 10 ML: at 09:42

## 2018-07-20 RX ADMIN — SODIUM CHLORIDE: 9 INJECTION, SOLUTION INTRAVENOUS at 21:29

## 2018-07-20 RX ADMIN — IPRATROPIUM BROMIDE AND ALBUTEROL SULFATE 1 AMPULE: .5; 3 SOLUTION RESPIRATORY (INHALATION) at 12:45

## 2018-07-20 RX ADMIN — OXYCODONE HYDROCHLORIDE 5 MG: 5 TABLET ORAL at 15:43

## 2018-07-20 RX ADMIN — OXYCODONE HYDROCHLORIDE 5 MG: 5 TABLET ORAL at 02:15

## 2018-07-20 RX ADMIN — OXYCODONE HYDROCHLORIDE 5 MG: 5 TABLET ORAL at 21:15

## 2018-07-20 RX ADMIN — METHYLPREDNISOLONE SODIUM SUCCINATE 40 MG: 40 INJECTION, POWDER, FOR SOLUTION INTRAMUSCULAR; INTRAVENOUS at 09:41

## 2018-07-20 ASSESSMENT — PAIN SCALES - GENERAL
PAINLEVEL_OUTOF10: 10
PAINLEVEL_OUTOF10: 0
PAINLEVEL_OUTOF10: 7
PAINLEVEL_OUTOF10: 1
PAINLEVEL_OUTOF10: 5
PAINLEVEL_OUTOF10: 0
PAINLEVEL_OUTOF10: 0
PAINLEVEL_OUTOF10: 8

## 2018-07-20 NOTE — PROGRESS NOTES
[address] [place] [address]     Department of Internal Medicine - Staff Internal Medicine Service     DAILY PROGRESS NOTE - TEAM       Patient:  Samantha Sahni  YOB: 1960  MRN: [id number]     Acct: [de-identified]     Admit date: 7/19/2018  Chief Complaint: Chronic obstructive pulmonary disease with acute exacerbation ([address]    Subjective:   Pt seen and Chart reviewed. On 5L NC, used BIPAP overnight. SOB is improving, still having wheezes, using prn albuterol bid   No diarrhea  Still productive cough dime sized green sputum     ROS  · Constitutional: Negative for weight loss  · ENT: Negative for Headaches, hearing loss, vertigo, mouth sores, sore throat. · Cardiovascular: Negative for lightheadedness/orthostatic symptoms ,chest pain, Positive for dyspnea on exertion, palpitations   · Respiratory - Positive cough, wheezing, sputum production,  no hemoptysis  · Gastrointestinal: Negative for nausea/vomiting, change in bowel habits, abdominal pain, dysphagia/appetite loss, hematemesis, blood in stools. · Genitourinary:Negative for change in bladder habits, dysuria, trouble voiding, hematuria. · Musculoskeletal: Negative for gait disturbance, weakness, joint complaints. · Integumentary: Negative for rash, pruritis. · Neurological: Negative for headache, dizziness, change in muscle strength, numbness/tingling, change in gait, balance, coordination,   · Psychiatric: negative for change in mood, affect, memory, mentation, behavior. · Endocrine: negative for temperature intolerance, excessive thirst, fluid intake, or urination, tremor. · Hematologic/Lymphatic: negative for abnormal bruising or bleeding, blood clots, swollen lymph nodes. · Allergic/Immunologic: negative for nasal congestion, pruritis, hives.     Objective:   BP (!) 94/57   Pulse 83   Temp 98.5 °F (36.9 °C) (Oral)   Resp 13   Ht 5' 7\" (1.702 m)   Wt 257 lb 15 oz (117 kg)   SpO2 100%   BMI 40.40 kg/m²       · General appearance: well nourished  · HEENT: Head: Normocephalic, no lesions, without obvious abnormality. · Lungs: On 5L NC. Increased WOB. no wheezes currently, CTAB. · Heart; Mild tachycardia present and rhythm normal, no murmur, click, rub or gallop  · Abdomen: soft, non-tender; bowel sounds normal; no masses,  no organomegaly  · Extremities: extremities normal, atraumatic, no cyanosis or edema  · Neurological: Gait normal. Reflexes normal and symmetric.  Sensation grossly normal  · Skin - no rash, no lump   · Eye no icterus no redness  · Psych-normal affect   · NEURO-no limb weakness  No facial droop  · Lymphatic system-no lymphadenopathy no splenomegaly    Intake/Output Summary (Last 24 hours) at 07/20/18 0915  Last data filed at 07/20/18 5139   Gross per 24 hour   Intake             3816 ml   Output                0 ml   Net             3816 ml         Medications:      magnesium sulfate  2 g Intravenous Once    lactated ringers bolus  1,000 mL Intravenous Once    sodium chloride flush  10 mL Intravenous 2 times per day    aspirin  81 mg Oral Daily    busPIRone  10 mg Oral TID    celecoxib  200 mg Oral Daily    DULoxetine  60 mg Oral Daily    vitamin B-12  250 mcg Oral Daily    sodium chloride flush  10 mL Intravenous 2 times per day    enoxaparin  40 mg Subcutaneous Daily    methylPREDNISolone  40 mg Intravenous Q8H    levofloxacin  750 mg Intravenous Q24H    ipratropium-albuterol  1 ampule Inhalation 4x daily    albuterol  2.5 mg Nebulization BID       Diagnostic Labs and Imaging    CBC: Recent Labs      07/19/18   0949  07/20/18   0631   WBC  18.8*  15.3*   RBC  4.62  3.96   HGB  13.8  11.9   HCT  44.6  39.2   MCV  96.5  99.0   RDW  14.9*  14.8*   PLT  327  311     BMP: Recent Labs      07/19/18   0942  07/19/18   0949  07/20/18   0631   NA   --   137  137   K   --   4.2  4.5   CL   --   97*  99   CO2   --   27  23   BUN   --   10  21*   CREATININE  0.87  0.75  0.95*     BNP: No results for Echocardiogram   Troponin 3 occasions negative    12-lead EKG revealed possible new inferior MI since 20-16    Depression - acutely worsened d/t life stressors. Says her meds have lemuel sub-theraputic   outpt f/u psych    Diarrhea - Day before arrival had 3 episodes of formed diarrhea. No longer having this. .. ? F/u C. diff      DVT PPx - lov 40    Discharge - will be to to home, will follow for any home needs    Koby Kim DO PGY1  Transitional Year Resident  7/20/18 9:15am  Attending Physician Statement  I have discussed the care of Arun Rodriguez, including pertinent history and exam findings,  with the resident. I have seen and examined the patient and the key elements of all parts of the encounter have been performed by me. I agree with the assessment, plan and orders as documented by the resident with additions . Still has bilateral wheezing although improved resp. Distress. Gas exchange is good. No clinical heart failure. Very likely has LAW. Will need sleep study and will benefit from BPAP. Will d/c IV but contine oral steroids  Continue present antibotics  Diet reviewed  Will follow in the office  She is not a Co2 retainer  Will assess for home O2    Treatment plan Discussed with nursing staff in detail , all questions answered . Electronically signed by Doc Burns MD on   7/20/18 at 7:47 PM    Please note that this chart was generated using voice recognition Dragon dictation software. Although every effort was made to ensure the accuracy of this automated transcription, some errors in transcription may have occurred.

## 2018-07-20 NOTE — PLAN OF CARE
Problem: Falls - Risk of:  Goal: Will remain free from falls  Will remain free from falls   Outcome: Met This Shift  Pt remains free of falls at this time. Bed locked in lowest position, siderails x2, call light in reach. Non-skid footwear applied. Pt ambulates in room with steady gait. Encouraged pt to call for assistance as needed for safety. Falling star posted outside of room. Will continue to monitor. Problem: Pain:  Goal: Control of acute pain  Control of acute pain   Outcome: Met This Shift  Pt able to communicate pain as needed, uses call light appropriately. Pt satisfied with pain interventions.

## 2018-07-20 NOTE — PLAN OF CARE
BRONCHOSPASM/BRONCHOCONSTRICTION     [x]         IMPROVE AERATION/BREATH SOUNDS  [x]   ADMINISTER BRONCHODILATOR THERAPY AS APPROPRIATE  [x]   ASSESS BREATH SOUNDS  [x]   IMPLEMENT AEROSOL/MDI PROTOCOL  [x]   PATIENT EDUCATION AS NEEDED    NONINVASIVE VENTILATION    PROVIDE OPTIMAL VENTILATION/ACCEPTABLE SPO2   IMPLEMENT NONINVASIVE VENTILATION PROTOCOL   MAINTAIN ACCEPTABLE SPO2   ASSESS SKIN INTEGRITY/BREAKDOWN SCORE   PATIENT EDUCATION AS NEEDED   BIPAP AS NEEDED

## 2018-07-20 NOTE — PLAN OF CARE
BRONCHOSPASM/BRONCHOCONSTRICTION     [x]         IMPROVE AERATION/BREATH SOUNDS  [x]   ADMINISTER BRONCHODILATOR THERAPY AS APPROPRIATE  [x]   ASSESS BREATH SOUNDS  [x]   IMPLEMENT AEROSOL/MDI PROTOCOL  [x]   PATIENT EDUCATION AS NEEDED    Jenelle Bagley Assessment complete. Sepsis ([facility] [A41.9]  Sepsis ([facility] [A41.9] . Vitals:    07/20/18 1252   BP:    Pulse:    Resp: 24   Temp:    SpO2: 94%   . Patients home meds are   Prior to Admission medications    Medication Sig Start Date End Date Taking? Authorizing Provider   DULoxetine (CYMBALTA) 60 MG extended release capsule Take 120 mg by mouth daily    Historical Provider, MD   lisinopril (PRINIVIL;ZESTRIL) 5 MG tablet Take 5 mg by mouth daily    Historical Provider, MD   aspirin 81 MG tablet Take 81 mg by mouth daily    Historical Provider, MD   glimepiride (AMARYL) 1 MG tablet Take 1 mg by mouth every morning (before breakfast)    Historical Provider, MD   busPIRone (BUSPAR) 10 MG tablet Take 10 mg by mouth 3 times daily    Historical Provider, MD   vitamin B-12 (CYANOCOBALAMIN) 100 MCG tablet Take 250 mcg by mouth daily    Historical Provider, MD   HYDROcodone-acetaminophen (NORCO)  MG per tablet Take 1 tablet by mouth every 6 hours as needed for Pain    Historical Provider, MD   meloxicam (MOBIC) 15 MG tablet take 1 tablet by mouth once daily if needed 12/12/14   Wilma Gatica DO   JANUVIA 100 MG tablet take 1 tablet by mouth once daily 4/25/14   Ana German MD   zolpidem (AMBIEN) 10 MG tablet Take 10 mg by mouth nightly as needed. Historical Provider, MD   celecoxib (CELEBREX) 200 MG capsule Take 1 capsule by mouth daily. 5/9/13   Wilma Gatica DO   tizanidine (ZANAFLEX) 4 MG tablet Take 4 mg by mouth every 6 hours as needed.  Takes prn    Historical Provider, MD   .    RR 24  Breath Sounds: Faint expiratory wheeze/diminished      · Bronchodilator assessment at level  4  · Hyperinflation assessment at level   · Secretion Management assessment at level    ·   · [x]    Bronchodilator Assessment  BRONCHODILATOR ASSESSMENT SCORE  Score 0 1 2 3 4 5   Breath Sounds   []  Patient Baseline []  No Wheeze good aeration []  Faint, scattered wheezing, good aeration []  Expiratory Wheezing and or moderately diminished [x]  Insp/Exp wheeze and/or very diminished []  Insp/Exp and/ or marked distress   Respiratory Rate   []  Patient Baseline []  Less than 20 []  Less than 20 [x]  20-25 []  Greater than 25 []  Greater than 25   Peak flow % of Pred or PB [x]  NA   []  Greater than 90%  []  81-90% []  71-80% []  Less than or equal to 70%  or unable to perform []  Unable due to Respiratory Distress   Dyspnea re []  Patient Baseline []  No SOB []  No SOB []  SOB on exertion [x]  SOB min activity []  At rest/acute   e FEV% Predicted       [x]  NA []  Above 69%  []  Unable []  Above 60-69%  []  Unable []  Above 50-59%  []  Unable []  Above 35-49%  []  Unable []  Less than 35%  []  Unable                 []  Hyperinflation Assessment  Score 1 2 3   CXR and Breath Sounds   []  Clear []  No atelectasis  Basilar aeration []  Atelectasis or absent basilar breath sounds   Incentive Spirometry Volume  (Per IBW)   []  Greater than or equal to 15ml/Kg []  less than 15ml/Kg []  less than 15ml/Kg   Surgery within last 2 weeks []  None or general   []  Abdominal or thoracic surgery  []  Abdominal or thoracic   Chronic Pulmonary Historyre []  No []  Yes []  Yes     []  Secretion Management Assessment  Score 1 2 3   Bilateral Breath Sounds   []  Occasional Rhonchi []  Scattered Rhonchi []  Course Rhonchi and/or poor aeration   Sputum    []  Small amount of thin secretions []  Moderate amount of viscous secretions []  Copius, Viscious Yellow/ Secretions   CXR as reported by physician []  clear  []  Unavailable []  Infiltrates and/or consolidation  []  Unavailable []  Mucus Plugging and or lobar consolidation  []  Unavailable   Cough []  Strong, productive cough []  Weak productive 50 395 424 452 481 510 538 567 596 625   55 292 307 321 336 351 366 381 396 55 [phone] 83 [phone]   70 269 284 299 314 329 344 359 374 70 353 382 410 439 468 496 525 554 583   75 261 274 289 305 319 334 348 364 75 344 372 400 429 458 487 515 544 573   80 253 266 282 296 312 327 342 356 80 335 362 390 419 448 [phone]

## 2018-07-21 VITALS
HEART RATE: 90 BPM | TEMPERATURE: 97.9 F | OXYGEN SATURATION: 96 % | HEIGHT: 67 IN | SYSTOLIC BLOOD PRESSURE: 100 MMHG | BODY MASS INDEX: 41.66 KG/M2 | RESPIRATION RATE: 15 BRPM | WEIGHT: 265.43 LBS | DIASTOLIC BLOOD PRESSURE: 73 MMHG

## 2018-07-21 LAB
ABSOLUTE EOS #: <0.03 K/UL (ref 0–0.44)
ABSOLUTE IMMATURE GRANULOCYTE: 0.42 K/UL (ref 0–0.3)
ABSOLUTE LYMPH #: 1.95 K/UL (ref 1.1–3.7)
ABSOLUTE MONO #: 1.06 K/UL (ref 0.1–1.2)
ANION GAP SERPL CALCULATED.3IONS-SCNC: 8 MMOL/L (ref 9–17)
BASOPHILS # BLD: 0 % (ref 0–2)
BASOPHILS ABSOLUTE: 0.04 K/UL (ref 0–0.2)
BUN BLDV-MCNC: 23 MG/DL (ref 6–20)
BUN/CREAT BLD: ABNORMAL (ref 9–20)
CALCIUM SERPL-MCNC: 8.9 MG/DL (ref 8.6–10.4)
CHLORIDE BLD-SCNC: 100 MMOL/L (ref 98–107)
CO2: 29 MMOL/L (ref 20–31)
CREAT SERPL-MCNC: 0.83 MG/DL (ref 0.5–0.9)
DIFFERENTIAL TYPE: ABNORMAL
EOSINOPHILS RELATIVE PERCENT: 0 % (ref 1–4)
GFR AFRICAN AMERICAN: >60 ML/MIN
GFR NON-AFRICAN AMERICAN: >60 ML/MIN
GFR SERPL CREATININE-BSD FRML MDRD: ABNORMAL ML/MIN/{1.73_M2}
GFR SERPL CREATININE-BSD FRML MDRD: ABNORMAL ML/MIN/{1.73_M2}
GLUCOSE BLD-MCNC: 313 MG/DL (ref 70–99)
HCT VFR BLD CALC: 39.2 % (ref 36.3–47.1)
HEMOGLOBIN: 11.5 G/DL (ref 11.9–15.1)
IMMATURE GRANULOCYTES: 3 %
LYMPHOCYTES # BLD: 13 % (ref 24–43)
MCH RBC QN AUTO: 29.6 PG (ref 25.2–33.5)
MCHC RBC AUTO-ENTMCNC: 29.3 G/DL (ref 28.4–34.8)
MCV RBC AUTO: 101 FL (ref 82.6–102.9)
MONOCYTES # BLD: 7 % (ref 3–12)
NRBC AUTOMATED: 0.1 PER 100 WBC
PDW BLD-RTO: 14.8 % (ref 11.8–14.4)
PLATELET # BLD: 294 K/UL (ref 138–453)
PLATELET ESTIMATE: ABNORMAL
PMV BLD AUTO: 11.5 FL (ref 8.1–13.5)
POTASSIUM SERPL-SCNC: 5.2 MMOL/L (ref 3.7–5.3)
RBC # BLD: 3.88 M/UL (ref 3.95–5.11)
RBC # BLD: ABNORMAL 10*6/UL
SEG NEUTROPHILS: 77 % (ref 36–65)
SEGMENTED NEUTROPHILS ABSOLUTE COUNT: 11.87 K/UL (ref 1.5–8.1)
SODIUM BLD-SCNC: 137 MMOL/L (ref 135–144)
WBC # BLD: 15.3 K/UL (ref 3.5–11.3)
WBC # BLD: ABNORMAL 10*3/UL

## 2018-07-21 PROCEDURE — 94640 AIRWAY INHALATION TREATMENT: CPT

## 2018-07-21 PROCEDURE — 36415 COLL VENOUS BLD VENIPUNCTURE: CPT

## 2018-07-21 PROCEDURE — 94762 N-INVAS EAR/PLS OXIMTRY CONT: CPT

## 2018-07-21 PROCEDURE — 6360000002 HC RX W HCPCS: Performed by: STUDENT IN AN ORGANIZED HEALTH CARE EDUCATION/TRAINING PROGRAM

## 2018-07-21 PROCEDURE — 80048 BASIC METABOLIC PNL TOTAL CA: CPT

## 2018-07-21 PROCEDURE — 6370000000 HC RX 637 (ALT 250 FOR IP): Performed by: STUDENT IN AN ORGANIZED HEALTH CARE EDUCATION/TRAINING PROGRAM

## 2018-07-21 PROCEDURE — 94618 PULMONARY STRESS TESTING: CPT

## 2018-07-21 PROCEDURE — 85025 COMPLETE CBC W/AUTO DIFF WBC: CPT

## 2018-07-21 PROCEDURE — 6370000000 HC RX 637 (ALT 250 FOR IP): Performed by: HOSPITALIST

## 2018-07-21 PROCEDURE — 99238 HOSP IP/OBS DSCHRG MGMT 30/<: CPT | Performed by: INTERNAL MEDICINE

## 2018-07-21 RX ORDER — PREDNISONE 20 MG/1
40 TABLET ORAL DAILY
Qty: 10 TABLET | Refills: 0 | Status: SHIPPED | OUTPATIENT
Start: 2018-07-21 | End: 2018-07-26

## 2018-07-21 RX ORDER — GABAPENTIN 600 MG/1
600 TABLET ORAL NIGHTLY
COMMUNITY
Start: 2018-06-13 | End: 2021-05-04

## 2018-07-21 RX ORDER — GABAPENTIN 600 MG/1
600 TABLET ORAL NIGHTLY
Status: DISCONTINUED | OUTPATIENT
Start: 2018-07-21 | End: 2018-07-21

## 2018-07-21 RX ORDER — LEVOFLOXACIN 750 MG/1
750 TABLET ORAL DAILY
Qty: 5 TABLET | Refills: 0 | Status: SHIPPED | OUTPATIENT
Start: 2018-07-21 | End: 2018-07-26

## 2018-07-21 RX ORDER — LEVOFLOXACIN 750 MG/1
750 TABLET ORAL DAILY
Status: DISCONTINUED | OUTPATIENT
Start: 2018-07-21 | End: 2018-07-21 | Stop reason: HOSPADM

## 2018-07-21 RX ORDER — GABAPENTIN 600 MG/1
600 TABLET ORAL EVERY EVENING
Status: DISCONTINUED | OUTPATIENT
Start: 2018-07-21 | End: 2018-07-21 | Stop reason: HOSPADM

## 2018-07-21 RX ADMIN — LEVOFLOXACIN 750 MG: 5 INJECTION, SOLUTION INTRAVENOUS at 00:12

## 2018-07-21 RX ADMIN — DULOXETINE HYDROCHLORIDE 60 MG: 30 CAPSULE, DELAYED RELEASE ORAL at 10:03

## 2018-07-21 RX ADMIN — GABAPENTIN 600 MG: 600 TABLET, FILM COATED ORAL at 03:13

## 2018-07-21 RX ADMIN — LEVOFLOXACIN 750 MG: 750 TABLET, FILM COATED ORAL at 09:46

## 2018-07-21 RX ADMIN — ENOXAPARIN SODIUM 40 MG: 100 INJECTION SUBCUTANEOUS at 09:46

## 2018-07-21 RX ADMIN — BUSPIRONE HYDROCHLORIDE 10 MG: 10 TABLET ORAL at 09:45

## 2018-07-21 RX ADMIN — IPRATROPIUM BROMIDE AND ALBUTEROL SULFATE 1 AMPULE: .5; 3 SOLUTION RESPIRATORY (INHALATION) at 08:50

## 2018-07-21 RX ADMIN — OXYCODONE HYDROCHLORIDE 5 MG: 5 TABLET ORAL at 10:15

## 2018-07-21 RX ADMIN — CYANOCOBALAMIN TAB 500 MCG 250 MCG: 500 TAB at 09:45

## 2018-07-21 RX ADMIN — CELECOXIB 200 MG: 100 CAPSULE ORAL at 09:45

## 2018-07-21 RX ADMIN — ALBUTEROL SULFATE 2.5 MG: 2.5 SOLUTION RESPIRATORY (INHALATION) at 03:18

## 2018-07-21 RX ADMIN — ASPIRIN 81 MG: 81 TABLET, COATED ORAL at 09:46

## 2018-07-21 RX ADMIN — BUSPIRONE HYDROCHLORIDE 10 MG: 10 TABLET ORAL at 14:09

## 2018-07-21 ASSESSMENT — PAIN SCALES - GENERAL
PAINLEVEL_OUTOF10: 6
PAINLEVEL_OUTOF10: 6

## 2018-07-21 NOTE — PLAN OF CARE
Problem: Breathing Pattern - Ineffective:  Goal: Ability to achieve and maintain a regular respiratory rate will improve  Ability to achieve and maintain a regular respiratory rate will improve   Outcome: Ongoing  Pt monitored on continuous pulse oximetry. Pt tolerating 5L nasal cannula. HOB elevated to promote optimal lung expansion. Coughing and deep breathing encouraged. Problem: Falls - Risk of:  Goal: Will remain free from falls  Will remain free from falls   Outcome: Ongoing  No falls this shift. Precautions include posted falling star sign, nonskid footwear on, bed locked in lowest position, siderails up x2, table and call light within reach. Bed alarm on. Patient calls out appropriately for assistance. Hourly rounding to assess for needs. Problem: Pain:  Goal: Pain level will decrease  Pain level will decrease   Outcome: Ongoing  Patient educated on available pain control measures including non-pharmacologic and medications. Pain goal discussed. Whiteboard updated for available time of next administration PRN. Will continue ordered interventions & assess pain.

## 2018-07-21 NOTE — PROGRESS NOTES
DATE: 2018    NAME: Arun Rodriguez  MRN: [id number]   : 1960    Patient not seen this date for Physical Therapy due to:  [] Blood transfusion in progress  [] Hemodialysis  []  Patient Declined  [] Spine Precautions   [] Strict Bedrest  [] Surgery/ Procedure  [] Testing      [] Other        [x] PT being discontinued at this time. Patient independent. No further needs. RN states pt independent and is discharging home today. [] PT being discontinued at this time as the patient has been transferred to palliative care. No further needs.     Nanette Snyder, PT

## 2018-07-21 NOTE — PROGRESS NOTES
Patient was evaluated today for the diagnosis of COPD. I entered a DME order for home oxygen because the diagnosis and testing requires the patient to have supplemental oxygen. Condition will improve or be benefited by oxygen use. The patient is  able to perform good mobility in a home setting and therefore does require the use of a portable oxygen system. The need for this equipment was discussed with the patient and she understands and is in agreement. Attending Physician Statement  I have discussed the care of Magdaleno Hodgkins, including pertinent history and exam findings,  with the resident. I have seen and examined the patient and the key elements of all parts of the encounter have been performed by me. I agree with the assessment, plan and orders as documented by the resident with additions . She understands and agrees to use home o2 as advised. Treatment plan Discussed with nursing staff in detail , all questions answered . Electronically signed by Herbert Ibrahim MD on   7/21/18 at 1:19 PM    Please note that this chart was generated using voice recognition Dragon dictation software. Although every effort was made to ensure the accuracy of this automated transcription, some errors in transcription may have occurred.

## 2018-07-21 NOTE — CARE COORDINATION
Transitional Planning  Notified by RN that patient qualifies for home oxygen. DME order and face to face documentation already done. Met with patient, given freedom of choice, referred to [facility]. Required documentation faxed to [facility].      Discharge 1 [facility] Case Management Department  Written by: Elie Castelan RN    Patient Name: Chiki Wilks  Attending Provider: China Carvalho MD  Admit Date: 2018  9:25 AM  MRN: [id number]  Account: [de-identified]                     : 1960  Discharge Date:  18       Disposition: home with home oxygen/HCS    Elie Castelan RN

## 2018-07-21 NOTE — PROGRESS NOTES
Writer gave and explained pt discharge discharge instructions. Writer educated pt on all new medications and follow up appointments and answered any questions pt had. Pt left with family in wheelchair with all belongings.   Electronically signed by Arsh Ash RN on 7/21/2018 at 4:54 PM

## 2018-07-21 NOTE — PROGRESS NOTES
Internal Medicine Resident  7/21/2018 11:23 AM  Attending Physician Statement  I have discussed the care of Lali Kingston, including pertinent history and exam findings,  with the resident. I have seen and examined the patient and the key elements of all parts of the encounter have been performed by me. I agree with the assessment, plan and orders as documented by the resident with additions . Will follow in the office in 4 weeks  Will assess for home O2 and arrange if needed    Treatment plan Discussed with nursing staff in detail , all questions answered . Electronically signed by Natalie Noble MD on   7/21/18 at 1:17 PM    Please note that this chart was generated using voice recognition Dragon dictation software. Although every effort was made to ensure the accuracy of this automated transcription, some errors in transcription may have occurred.

## 2018-07-21 NOTE — PROGRESS NOTES
[]  Mucus Plugging and or lobar consolidation  []  Unavailable   Cough []  Strong, productive cough []  Weak productive cough []  No cough or weak non-productive cough   JEREMY FREEMAN  8:55 AM                            FEMALE                                  MALE                            FEV1 Predicted Normal Values                        FEV1 Predicted Normal Values          Age                                     Height in Feet and Inches       Age                                     Height in Feet and Inches       4' 11\" 5' 1\" 5' 3\" 5' 5\" 5' 7\" 5' 9\" 5' 11\" 6' 1\"  4' 11\" 5' 1\" 5' 3\" 5' 5\" 5' 7\" 5' 9\" 5' 11\" 6' 1\"   42 - 45 2.49 2.66 2.84 3.03 3.22 3.42 3.62 3.83 42 - 45 2.82 3.03 3.26 3.49 3.72 3.96 4.22 4.47   46 - 49 2.40 2.57 2.76 2.94 3.14 3.33 3.54 3.75 46 - 49 2.70 2.92 3.14 3.37 3.61 3.85 4.10 4.36   50 - 53 2.31 2.48 2.66 2.85 3.04 3.24 3.45 3.66 50 - 53 2.58 2.80 3.02 3.25 3.49 3.73 3.98 4.24   54 - 57 2.21 2.38 2.57 2.75 2.95 3.14 3.35 3.56 54 - 57 2.46 2.67 2.89 3.12 3.36 3.60 3.85 4.11   58 - 61 2.10 2.28 2.46 2.65 2.84 3.04 3.24 3.45 58 - 61 2.32 2.54 2.76 2.99 3.23 3.47 3.72 3.98   62 - 65 1.99 2.17 2.35 2.54 2.73 2.93 3.13 3.34 62 - 65 2.19 2.40 2.62 2.85 3.09 3.33 3.58 3.84   66 - 69 1.88 2.05 2.23 2.42 2.61 2.81 3.02 3.23 66 - 69 2.04 2.26 2.48 2.71 2.95 3.19 3.44 3.70   70+ 1.82 1.99 2.17 2.36 2.55 2.75 2.95 3.16 70+ 1.97 2.19 2.41 2.64 2.87 3.12 3.37 3.62             Predicted Peak Expiratory Flow Rate                                       Height (in)  Female       Height (in) Male           Age 58 63 61 63 56 77 78 74 Age            20 677 773 393 052 683 180 335 090  91 88 38 26 75 22 79 90 91   25 337 352 366 381 396 411 426 441 25 447 476 505 533 562 591 619 648 677   30 329 344 359 374 389 404 419 434 30 437 466 494 523 552 580 609 63 667   35 322 337 351 366 381 396 411 426 35 426 455 484 512 541 570 598 627 657   40 314 329 344 359 374 389 404 419 40 416 445 [phone]

## 2018-07-25 LAB
CULTURE: NORMAL
CULTURE: NORMAL
Lab: NORMAL
Lab: NORMAL
SPECIMEN DESCRIPTION: NORMAL
SPECIMEN DESCRIPTION: NORMAL
STATUS: NORMAL
STATUS: NORMAL

## 2018-07-25 NOTE — DISCHARGE SUMMARY
[address] [place] [address]     Department of Internal Medicine - Staff Internal Medicine Service    INPATIENT DISCHARGE SUMMARY      PATIENT IDENTIFICATION:  NAME:  Radha Suero   :   1960  MRN:    [id number]     Acct:    [de-identified]   Admit Date:  2018  Discharge date:  2018  4:34 PM   Attending Provider: No att. providers found                                     REASON FOR HOSPITALIZATION:   Radha Suero is a 62 y.o. female who presented with dyspnea. DIAGNOSES:  Primary:   Sepsis ([address] [A41.9]  Sepsis ([address] [A41.9]    Secondary: Active Hospital Problems    Diagnosis Date Noted    Obesity (BMI 35.0-39.9 without comorbidity) [E66.9]     Chronic obstructive pulmonary disease with acute exacerbation (HCC) [J44.1] 2018    LAW (obstructive sleep apnea) [G47.33] 2018    Smoker [F17.200] 2018    Pneumonia due to organism [J18.9]     Hypercholesteremia [E78.00] 2013       TREATMENT:  Brief Inpatient Course:   Patient is a 63-year-old female with past medical history of COPD and smoking presented with complaints of dyspnea for the past 5 days. She also complained of worsening cough for past 2 days associated with greenish yellow sputum. She additionally complained of some loose stools and was somewhat somnolent on presentation. In the ED her oxygen saturation was found to be in low 80s and was placed on BiPAP which improved her saturation. Examination showed bilateral diffuse wheezes and chest x-ray showed possibility for pneumonia and patient was admitted. While admitted patient was started on DuoNeb Solu-Medrol IV and Levaquin IV with albuterol PRN. During her stay she experienced tachycardia which was evaluated by EKG showing potential ischemic changes. However troponins were found to be negative. Patient's dyspnea and leukocytosis gradually improved and she was discharged with approval for home O2.          Consults:   none    Procedures:

## 2021-05-04 ENCOUNTER — OFFICE VISIT (OUTPATIENT)
Dept: PODIATRY | Age: 61
End: 2021-05-04
Payer: MEDICARE

## 2021-05-04 VITALS — BODY MASS INDEX: 41.59 KG/M2 | WEIGHT: 265 LBS | HEIGHT: 67 IN

## 2021-05-04 DIAGNOSIS — E13.610 CHARCOT'S JOINT DISEASE DUE TO SECONDARY DIABETES (HCC): Primary | ICD-10-CM

## 2021-05-04 DIAGNOSIS — M79.671 PAIN IN RIGHT FOOT: ICD-10-CM

## 2021-05-04 DIAGNOSIS — M25.571 ACUTE RIGHT ANKLE PAIN: ICD-10-CM

## 2021-05-04 DIAGNOSIS — R60.0 EDEMA OF LOWER EXTREMITY: ICD-10-CM

## 2021-05-04 PROCEDURE — 99203 OFFICE O/P NEW LOW 30 MIN: CPT | Performed by: PODIATRIST

## 2021-05-04 PROCEDURE — 3017F COLORECTAL CA SCREEN DOC REV: CPT | Performed by: PODIATRIST

## 2021-05-04 PROCEDURE — 4004F PT TOBACCO SCREEN RCVD TLK: CPT | Performed by: PODIATRIST

## 2021-05-04 PROCEDURE — G8427 DOCREV CUR MEDS BY ELIG CLIN: HCPCS | Performed by: PODIATRIST

## 2021-05-04 PROCEDURE — 3046F HEMOGLOBIN A1C LEVEL >9.0%: CPT | Performed by: PODIATRIST

## 2021-05-04 PROCEDURE — G8419 CALC BMI OUT NRM PARAM NOF/U: HCPCS | Performed by: PODIATRIST

## 2021-05-04 RX ORDER — CALCIUM CARBONATE/VITAMIN D3 600 MG-20
TABLET ORAL
COMMUNITY
Start: 2021-04-27

## 2021-05-04 RX ORDER — CITALOPRAM 20 MG/1
TABLET ORAL
COMMUNITY

## 2021-05-04 RX ORDER — BLOOD SUGAR DIAGNOSTIC
STRIP MISCELLANEOUS
COMMUNITY
Start: 2021-04-27

## 2021-05-04 RX ORDER — DULOXETIN HYDROCHLORIDE 30 MG/1
CAPSULE, DELAYED RELEASE ORAL
COMMUNITY

## 2021-05-04 RX ORDER — LORATADINE 10 MG/1
TABLET ORAL
COMMUNITY

## 2021-05-04 RX ORDER — ATORVASTATIN CALCIUM 80 MG/1
TABLET, FILM COATED ORAL
COMMUNITY

## 2021-05-04 RX ORDER — OMEPRAZOLE 20 MG/1
CAPSULE, DELAYED RELEASE ORAL
COMMUNITY
Start: 2021-04-15

## 2021-05-04 RX ORDER — GABAPENTIN 300 MG/1
CAPSULE ORAL
COMMUNITY
Start: 2021-04-16

## 2021-05-04 RX ORDER — OXYBUTYNIN CHLORIDE 5 MG/1
TABLET ORAL
COMMUNITY
Start: 2021-04-27

## 2021-05-04 RX ORDER — CREAM BASE NO.47
CREAM (GRAM) TOPICAL
COMMUNITY
Start: 2021-03-22

## 2021-05-04 RX ORDER — GLIMEPIRIDE 2 MG/1
TABLET ORAL
COMMUNITY
Start: 2021-04-16

## 2021-05-04 ASSESSMENT — ENCOUNTER SYMPTOMS
SHORTNESS OF BREATH: 0
COLOR CHANGE: 0
BACK PAIN: 0
DIARRHEA: 0
NAUSEA: 0

## 2021-05-04 NOTE — PROGRESS NOTES
Martina Durbin is a 61 y.o. female who presents to the office today with chief complaint of pain to the right foot and ankle. Chief Complaint   Patient presents with    Consultation     Est new pt care     Ankle Pain     Right Ankle - per pt flat foot, charcut disease, w/ neuropathy    Diabetes     Last A1c 6.0 on 03/18/21 per pt    Symptoms began about 14 year(s) ago. Patient denies injury to the right foot or ankle. Patient states that 14 years ago she developed Charcot joint disease from diabetes. Pain is rated 10 out of 10 at it's worst and is described as intermittent. Treatments prior to today's visit include: None. Patient was seen by several different doctors and has been told that there is nothing that can be done. No Known Allergies    Past Medical History:   Diagnosis Date    Anemia 5/9/2013    Anxiety     Chronic obstructive pulmonary disease with acute exacerbation ([facility] [address] 7/19/2018    Depression     Diabetes mellitus ([facility] [address]     DJD (degenerative joint disease)     ankle     DM type 2, uncontrolled, with neuropathy ([facility] [place] [address] 5/9/2013    HTN (hypertension) 5/9/2013    Hypercholesteremia 5/9/2013    Hyperlipidemia     Hypertension     Insomnia 5/9/2013    Lung disease     LAW (obstructive sleep apnea) 7/19/2018    Pneumonia due to organism     Polyuria 5/9/2013    Sepsis ([facility] [address] 7/19/2018    Type II or unspecified type diabetes mellitus without mention of complication, not stated as uncontrolled     Vitamin D deficiency 5/9/2013       Prior to Admission medications    Medication Sig Start Date End Date Taking? Authorizing Provider   glimepiride (AMARYL) 2 MG tablet TAKE 1 TABLET BY MOUTH ONCE DAILY AFTER A MEAL 4/16/21  Yes Historical Provider, MD   DULoxetine (CYMBALTA) 30 MG extended release capsule Cymbalta 30 mg capsule,delayed release   Take 1 capsule every day by oral route for 14 days.    Yes Historical Provider, MD   oxybutynin (DITROPAN) 5 MG tablet TAKE 1 TABLET BY MOUTH TWICE A DAY 4/27/21  Yes Historical Provider, MD   omeprazole (PRILOSEC) 20 MG delayed release capsule TAKE 1 CAPSULE BY MOUTH EVERY DAY IN THE MORNING 4/15/21  Yes Historical Provider, MD   loratadine (CLARITIN) 10 MG tablet loratadine 10 mg tablet   Yes Historical Provider, MD   ONETOUCH ULTRA strip USE TO TEST TWICE A DAY 4/27/21  Yes Historical Provider, MD   gabapentin (NEURONTIN) 300 MG capsule TAKE 1 CAPSULE BY MOUTH THREE TIMES A DAY 4/16/21  Yes Historical Provider, MD   Cream Base (PCCA VANPEN BASE) CREA  3/22/21  Yes Historical Provider, MD   citalopram (CELEXA) 20 MG tablet Celexa 20 mg tablet   Take 1 tablet every day by oral route for 60 days.    Yes Historical Provider, MD   [employer] D3 50 MCG (2000 UT) CAPS TAKE 1 CAPSULE BY MOUTH EVERY DAY 4/27/21  Yes Historical Provider, MD   atorvastatin (LIPITOR) 80 MG tablet atorvastatin 80 mg tablet   Yes Historical Provider, MD   mometasone-formoterol (DULERA) 200-5 MCG/ACT inhaler Inhale 2 puffs into the lungs every 12 hours 7/21/18  Yes Froylan Gonzalez MD   lisinopril (PRINIVIL;ZESTRIL) 5 MG tablet Take 5 mg by mouth daily   Yes Historical Provider, MD   aspirin 81 MG tablet Take 81 mg by mouth daily   Yes Historical Provider, MD   vitamin B-12 (CYANOCOBALAMIN) 100 MCG tablet Take 250 mcg by mouth daily   Yes Historical Provider, MD   meloxicam (MOBIC) 15 MG tablet take 1 tablet by mouth once daily if needed 12/12/14  Yes Wilma Gatica DO   JANUVIA 100 MG tablet take 1 tablet by mouth once daily 4/25/14  Yes Jj Keenan MD       Past Surgical History:   Procedure Laterality Date    ABDOMINAL EXPLORATION SURGERY      FOOT SURGERY  1985-87    a total of 3    HAND SURGERY Bilateral     PARTIAL HYSTERECTOMY  1988    TONSILLECTOMY  1980       Family History   Problem Relation Age of Onset    Heart Disease Mother         Broken Heart Syndrome    Diabetes Mother     Heart Disease Father        Social History     Tobacco Use    Smoking status: Current Every Day Smoker     Packs/day: 1.50     Years: 30.00     Pack years: 45.00     Types: Cigarettes    Smokeless tobacco: Never Used   Substance Use Topics    Alcohol use: No     Comment: social       Review of Systems   Constitutional: Negative for activity change, appetite change, chills, diaphoresis, fatigue and fever. Respiratory: Negative for shortness of breath. Cardiovascular: Negative for leg swelling. Gastrointestinal: Negative for diarrhea and nausea. Endocrine: Negative for cold intolerance, heat intolerance and polyuria. Musculoskeletal: Positive for arthralgias, gait problem and joint swelling. Negative for back pain and myalgias. Skin: Negative for color change, pallor, rash and wound. Allergic/Immunologic: Negative for environmental allergies and food allergies. Neurological: Negative for dizziness, weakness, light-headedness and numbness. Hematological: Does not bruise/bleed easily. Psychiatric/Behavioral: Negative for behavioral problems, confusion and self-injury. The patient is not nervous/anxious. Vitals: There were no vitals filed for this visit. General: AAO x 3 in NAD. Integument: There are no rashes, ulcers, or breaks in the skin noted to the bilateral lower extremities. There is no induration, subcutaneous nodules, or tightening of the skin noted to the bilateral.     Toenails 1-5 of the right foot do not present with thickness, elongation, discoloration, brittleness, subungual debris. Toenails 1-5 of the left foot do not present with thickness, elongation, discoloration, brittleness, subungual debris. Interdigital maceration absent to web spaces 1-4, Bilateral.     There are no preulcerative lesions noted to the right foot. There are no preulcerative lesions noted to the left foot. The skin to the bilateral feet is not thin and shiny. The skin to the bilateral feet is  warm, supple, and dry.       Vascular: DP pulse of the right foot is palpable. DP pulse of the left foot is  palpable. PT pulse of the right foot is  palpable. PT pulse of the left foot is  palpable. CFT is less than 3 secs to the digits of the right foot. CFT is less than 3 secs to the digits of the left foot. There is edema noted to the right foot and ankle. There is hair growth noted to the digits of the bilateral feet. There are no varicosities noted to the right foot/ankle. There are no varicosities noted to the left foot/ankle. Erythema is absent to the bilateral feet. Neurological: Reflexes are present to the right plantar foot and to the Achilles tendon. Reflexes are present to the left plantar foot and to the Achilles tendon. Epicritic sensation is  intact to the right foot. Epicritic sensation is  intact to the left foot. Musculoskeletal:  Muscle strength is +5/5 to all four muscle groups of the left lower extremity. Muscle strength to the right lower extremity is +4/5 and painful. Range of motion to the right ankle is absent and there is pain with attempt at this range of motion. Range of motion to the left ankle is  free of pain or grinding. Range of motion to the right subtalar joint is absent and there is pain with attempt at this range of motion. Range of motion to the left subtalar joint is  free of pain or grinding. No abnormalities, asymmetries, or misalignments are seen between the extremities. Weightbearing evaluation does reveal rearfoot eversion, medial prominence of the talar head, loss of the medial longitudinal arch height, and too many toes sign bilaterally, right greater than left. The lesser digits of the right foot are contracted. The lesser digits of the left foot are contracted. There is no prominence noted to the first metatarsal head without abduction of the hallux of the right foot.      There is no prominence noted to the first metatarsal head without abduction of the hallux of the left foot. Shoe examination was performed. Biomechanical Exam: abnormal right as the patient limps. X-ray's taken: AP, Lateral, and Medial Oblique of the right foot and ankle. Findings: There is nearly complete loss of the talar body. The head of the talus is intact at the talonavicular joint, but the rest of the talus is obliterated and the tibial plafond is in close contact with the posterior facet of the calcaneus. Asessment: Patient is a 61 y.o. female with:    Diagnosis Orders   1. Charcot's joint disease due to secondary diabetes ([facility] [address]  XR ANKLE RIGHT (MIN 3 VIEWS)    XR FOOT RIGHT (2 VIEWS)    Amb External Referral For Orthotics   2. Edema of lower extremity  XR ANKLE RIGHT (MIN 3 VIEWS)    XR FOOT RIGHT (2 VIEWS)    Amb External Referral For Orthotics   3. Acute right ankle pain  XR ANKLE RIGHT (MIN 3 VIEWS)    Amb External Referral For Orthotics   4. Pain in right foot  XR FOOT RIGHT (2 VIEWS)    Amb External Referral For Orthotics       Plan:  1. Clinical evaluation of the patient. 2. Patient educated on her condition and informed that she would do best with an [place] AFO. Patient informed that this device is expected to decrease abnormal motion, offer support, and reduce her pain. Patient given an order to obtain this. 3. Contact office with any questions/problems/concerns. Return if symptoms worsen or fail to improve.    5/4/2021      Hazel Hamilton DPM

## 2023-04-30 ENCOUNTER — APPOINTMENT (OUTPATIENT)
Dept: GENERAL RADIOLOGY | Age: 63
DRG: 861 | End: 2023-04-30
Payer: MEDICAID

## 2023-04-30 ENCOUNTER — APPOINTMENT (OUTPATIENT)
Dept: CT IMAGING | Age: 63
DRG: 861 | End: 2023-04-30
Payer: MEDICAID

## 2023-04-30 ENCOUNTER — HOSPITAL ENCOUNTER (INPATIENT)
Age: 63
LOS: 1 days | Discharge: HOME | DRG: 861 | End: 2023-05-01
Attending: EMERGENCY MEDICINE | Admitting: INTERNAL MEDICINE
Payer: MEDICAID

## 2023-04-30 DIAGNOSIS — C34.11 MALIGNANT NEOPLASM OF UPPER LOBE OF RIGHT LUNG (HCC): Primary | ICD-10-CM

## 2023-04-30 DIAGNOSIS — M89.9 LYTIC BONE LESIONS ON XRAY: ICD-10-CM

## 2023-04-30 PROBLEM — J44.9 COPD WITHOUT EXACERBATION (HCC): Status: ACTIVE | Noted: 2018-07-19

## 2023-04-30 PROBLEM — G89.3 CANCER RELATED PAIN: Status: ACTIVE | Noted: 2023-04-30

## 2023-04-30 PROBLEM — C79.51 PAIN DUE TO MALIGNANT NEOPLASM METASTATIC TO BONE (HCC): Status: ACTIVE | Noted: 2023-04-30

## 2023-04-30 PROBLEM — C34.90 LUNG CANCER (HCC): Status: ACTIVE | Noted: 2023-04-30

## 2023-04-30 PROBLEM — C79.51 CANCER, METASTATIC TO BONE (HCC): Status: ACTIVE | Noted: 2023-04-30

## 2023-04-30 PROBLEM — C34.91 NON-SMALL CELL CARCINOMA OF LUNG, RIGHT (HCC): Status: ACTIVE | Noted: 2023-04-30

## 2023-04-30 PROBLEM — Z87.891 FORMER SMOKER: Status: ACTIVE | Noted: 2023-04-30

## 2023-04-30 PROBLEM — E66.01 SEVERE OBESITY (BMI >= 40) (HCC): Status: ACTIVE | Noted: 2023-04-30

## 2023-04-30 LAB
ABSOLUTE EOS #: 0 K/UL (ref 0–0.4)
ABSOLUTE IMMATURE GRANULOCYTE: 0 K/UL (ref 0–0.3)
ABSOLUTE LYMPH #: 0.68 K/UL (ref 1–4.8)
ABSOLUTE MONO #: 0.57 K/UL (ref 0.2–0.8)
ANION GAP SERPL CALCULATED.3IONS-SCNC: 10 MMOL/L (ref 9–17)
BASOPHILS # BLD: 0 %
BASOPHILS ABSOLUTE: 0 K/UL (ref 0–0.2)
BUN SERPL-MCNC: 18 MG/DL (ref 8–23)
BUN/CREAT BLD: 28 (ref 9–20)
CALCIUM SERPL-MCNC: 9.4 MG/DL (ref 8.6–10.4)
CHLORIDE SERPL-SCNC: 90 MMOL/L (ref 98–107)
CO2 SERPL-SCNC: 33 MMOL/L (ref 20–31)
CREAT SERPL-MCNC: 0.65 MG/DL (ref 0.5–0.9)
D DIMER BLD IA.RAPID-MCNC: 1.15 UG/ML FEU (ref 0–0.59)
EKG ATRIAL RATE: 92 BPM
EKG P AXIS: 62 DEGREES
EKG P-R INTERVAL: 180 MS
EKG Q-T INTERVAL: 388 MS
EKG QRS DURATION: 94 MS
EKG QTC CALCULATION (BAZETT): 479 MS
EKG R AXIS: -41 DEGREES
EKG T AXIS: 39 DEGREES
EKG VENTRICULAR RATE: 92 BPM
EOSINOPHILS RELATIVE PERCENT: 0 % (ref 1–4)
GFR SERPL CREATININE-BSD FRML MDRD: >60 ML/MIN/1.73M2
GLUCOSE BLD-MCNC: 160 MG/DL (ref 65–105)
GLUCOSE BLD-MCNC: 189 MG/DL (ref 65–105)
GLUCOSE BLD-MCNC: 205 MG/DL (ref 65–105)
GLUCOSE SERPL-MCNC: 241 MG/DL (ref 70–99)
HCT VFR BLD AUTO: 30.5 % (ref 36.3–47.1)
HGB BLD-MCNC: 9.1 G/DL (ref 11.9–15.1)
IMMATURE GRANULOCYTES: 0 %
LYMPHOCYTES # BLD: 6 % (ref 24–44)
MCH RBC QN AUTO: 26.1 PG (ref 25.2–33.5)
MCHC RBC AUTO-ENTMCNC: 29.8 G/DL (ref 28.4–34.8)
MCV RBC AUTO: 87.6 FL (ref 82.6–102.9)
MONOCYTES # BLD: 5 % (ref 1–7)
MORPHOLOGY: ABNORMAL
MYOGLOBIN SERPL-MCNC: 31 NG/ML (ref 25–58)
NRBC AUTOMATED: 0 PER 100 WBC
PDW BLD-RTO: 16.2 % (ref 11.8–14.4)
PLATELET # BLD AUTO: 346 K/UL (ref 138–453)
PMV BLD AUTO: 10.4 FL (ref 8.1–13.5)
POTASSIUM SERPL-SCNC: 4.1 MMOL/L (ref 3.7–5.3)
RBC # BLD: 3.48 M/UL (ref 3.95–5.11)
SEG NEUTROPHILS: 89 % (ref 36–66)
SEGMENTED NEUTROPHILS ABSOLUTE COUNT: 10.05 K/UL (ref 1.8–7.7)
SODIUM SERPL-SCNC: 133 MMOL/L (ref 135–144)
TROPONIN I SERPL DL<=0.01 NG/ML-MCNC: 47 NG/L (ref 0–14)
TROPONIN I SERPL DL<=0.01 NG/ML-MCNC: 54 NG/L (ref 0–14)
WBC # BLD AUTO: 11.3 K/UL (ref 3.5–11.3)

## 2023-04-30 PROCEDURE — 96375 TX/PRO/DX INJ NEW DRUG ADDON: CPT

## 2023-04-30 PROCEDURE — 99285 EMERGENCY DEPT VISIT HI MDM: CPT

## 2023-04-30 PROCEDURE — 1200000000 HC SEMI PRIVATE

## 2023-04-30 PROCEDURE — 80048 BASIC METABOLIC PNL TOTAL CA: CPT

## 2023-04-30 PROCEDURE — 85025 COMPLETE CBC W/AUTO DIFF WBC: CPT

## 2023-04-30 PROCEDURE — 6370000000 HC RX 637 (ALT 250 FOR IP): Performed by: INTERNAL MEDICINE

## 2023-04-30 PROCEDURE — 99223 1ST HOSP IP/OBS HIGH 75: CPT | Performed by: INTERNAL MEDICINE

## 2023-04-30 PROCEDURE — 6360000002 HC RX W HCPCS: Performed by: EMERGENCY MEDICINE

## 2023-04-30 PROCEDURE — 2580000003 HC RX 258: Performed by: INTERNAL MEDICINE

## 2023-04-30 PROCEDURE — 6360000002 HC RX W HCPCS: Performed by: INTERNAL MEDICINE

## 2023-04-30 PROCEDURE — 94761 N-INVAS EAR/PLS OXIMETRY MLT: CPT

## 2023-04-30 PROCEDURE — 2700000000 HC OXYGEN THERAPY PER DAY

## 2023-04-30 PROCEDURE — 2580000003 HC RX 258: Performed by: EMERGENCY MEDICINE

## 2023-04-30 PROCEDURE — 93005 ELECTROCARDIOGRAM TRACING: CPT | Performed by: EMERGENCY MEDICINE

## 2023-04-30 PROCEDURE — 6360000004 HC RX CONTRAST MEDICATION: Performed by: EMERGENCY MEDICINE

## 2023-04-30 PROCEDURE — 71045 X-RAY EXAM CHEST 1 VIEW: CPT

## 2023-04-30 PROCEDURE — 96376 TX/PRO/DX INJ SAME DRUG ADON: CPT

## 2023-04-30 PROCEDURE — 71260 CT THORAX DX C+: CPT

## 2023-04-30 PROCEDURE — 83874 ASSAY OF MYOGLOBIN: CPT

## 2023-04-30 PROCEDURE — 82947 ASSAY GLUCOSE BLOOD QUANT: CPT

## 2023-04-30 PROCEDURE — 84484 ASSAY OF TROPONIN QUANT: CPT

## 2023-04-30 PROCEDURE — 96374 THER/PROPH/DIAG INJ IV PUSH: CPT

## 2023-04-30 PROCEDURE — 93010 ELECTROCARDIOGRAM REPORT: CPT | Performed by: INTERNAL MEDICINE

## 2023-04-30 PROCEDURE — 85379 FIBRIN DEGRADATION QUANT: CPT

## 2023-04-30 RX ORDER — OXYCODONE HYDROCHLORIDE 5 MG/1
10 CAPSULE ORAL EVERY 4 HOURS PRN
Status: ON HOLD | COMMUNITY
End: 2023-05-01

## 2023-04-30 RX ORDER — DULOXETIN HYDROCHLORIDE 30 MG/1
30 CAPSULE, DELAYED RELEASE ORAL DAILY
Status: DISCONTINUED | OUTPATIENT
Start: 2023-04-30 | End: 2023-05-01 | Stop reason: HOSPADM

## 2023-04-30 RX ORDER — HYDROMORPHONE HYDROCHLORIDE 1 MG/ML
1 INJECTION, SOLUTION INTRAMUSCULAR; INTRAVENOUS; SUBCUTANEOUS ONCE
Status: COMPLETED | OUTPATIENT
Start: 2023-04-30 | End: 2023-04-30

## 2023-04-30 RX ORDER — ACETAMINOPHEN 325 MG/1
650 TABLET ORAL EVERY 6 HOURS PRN
Status: DISCONTINUED | OUTPATIENT
Start: 2023-04-30 | End: 2023-05-01 | Stop reason: HOSPADM

## 2023-04-30 RX ORDER — TRAZODONE HYDROCHLORIDE 50 MG/1
50 TABLET ORAL NIGHTLY PRN
COMMUNITY

## 2023-04-30 RX ORDER — OXYCODONE HYDROCHLORIDE 5 MG/1
10 TABLET ORAL EVERY 4 HOURS PRN
Status: DISCONTINUED | OUTPATIENT
Start: 2023-04-30 | End: 2023-05-01 | Stop reason: HOSPADM

## 2023-04-30 RX ORDER — MORPHINE SULFATE 30 MG/1
45 TABLET ORAL 2 TIMES DAILY
Status: ON HOLD | COMMUNITY
End: 2023-05-01

## 2023-04-30 RX ORDER — FENTANYL CITRATE 50 UG/ML
100 INJECTION, SOLUTION INTRAMUSCULAR; INTRAVENOUS ONCE
Status: COMPLETED | OUTPATIENT
Start: 2023-04-30 | End: 2023-04-30

## 2023-04-30 RX ORDER — ENOXAPARIN SODIUM 100 MG/ML
30 INJECTION SUBCUTANEOUS 2 TIMES DAILY
Status: DISCONTINUED | OUTPATIENT
Start: 2023-04-30 | End: 2023-05-01 | Stop reason: HOSPADM

## 2023-04-30 RX ORDER — SODIUM CHLORIDE 0.9 % (FLUSH) 0.9 %
5-40 SYRINGE (ML) INJECTION EVERY 12 HOURS SCHEDULED
Status: DISCONTINUED | OUTPATIENT
Start: 2023-04-30 | End: 2023-05-01 | Stop reason: HOSPADM

## 2023-04-30 RX ORDER — SODIUM CHLORIDE 9 MG/ML
INJECTION, SOLUTION INTRAVENOUS PRN
Status: DISCONTINUED | OUTPATIENT
Start: 2023-04-30 | End: 2023-05-01 | Stop reason: HOSPADM

## 2023-04-30 RX ORDER — SERTRALINE HYDROCHLORIDE 100 MG/1
100 TABLET, FILM COATED ORAL DAILY
Status: DISCONTINUED | OUTPATIENT
Start: 2023-04-30 | End: 2023-05-01 | Stop reason: HOSPADM

## 2023-04-30 RX ORDER — ONDANSETRON 2 MG/ML
4 INJECTION INTRAMUSCULAR; INTRAVENOUS ONCE
Status: COMPLETED | OUTPATIENT
Start: 2023-04-30 | End: 2023-04-30

## 2023-04-30 RX ORDER — OXYCODONE HYDROCHLORIDE 10 MG/1
10 TABLET ORAL EVERY 6 HOURS PRN
COMMUNITY
Start: 2023-04-24

## 2023-04-30 RX ORDER — SERTRALINE HYDROCHLORIDE 100 MG/1
100 TABLET, FILM COATED ORAL DAILY
COMMUNITY

## 2023-04-30 RX ORDER — ALOGLIPTIN 25 MG/1
25 TABLET, FILM COATED ORAL DAILY
Status: DISCONTINUED | OUTPATIENT
Start: 2023-04-30 | End: 2023-05-01 | Stop reason: HOSPADM

## 2023-04-30 RX ORDER — CALCIUM CARBONATE 200(500)MG
1000 TABLET,CHEWABLE ORAL 3 TIMES DAILY PRN
Status: DISCONTINUED | OUTPATIENT
Start: 2023-04-30 | End: 2023-05-01 | Stop reason: HOSPADM

## 2023-04-30 RX ORDER — ATORVASTATIN CALCIUM 80 MG/1
80 TABLET, FILM COATED ORAL NIGHTLY
Status: DISCONTINUED | OUTPATIENT
Start: 2023-04-30 | End: 2023-05-01 | Stop reason: HOSPADM

## 2023-04-30 RX ORDER — SENNA PLUS 8.6 MG/1
1 TABLET ORAL DAILY PRN
Status: DISCONTINUED | OUTPATIENT
Start: 2023-04-30 | End: 2023-05-01 | Stop reason: HOSPADM

## 2023-04-30 RX ORDER — HYDROMORPHONE HYDROCHLORIDE 1 MG/ML
1 INJECTION, SOLUTION INTRAMUSCULAR; INTRAVENOUS; SUBCUTANEOUS
Status: DISCONTINUED | OUTPATIENT
Start: 2023-04-30 | End: 2023-05-01 | Stop reason: HOSPADM

## 2023-04-30 RX ORDER — BISACODYL 10 MG
10 SUPPOSITORY, RECTAL RECTAL DAILY PRN
Status: DISCONTINUED | OUTPATIENT
Start: 2023-04-30 | End: 2023-05-01 | Stop reason: HOSPADM

## 2023-04-30 RX ORDER — OXYBUTYNIN CHLORIDE 5 MG/1
5 TABLET ORAL 2 TIMES DAILY
Status: DISCONTINUED | OUTPATIENT
Start: 2023-04-30 | End: 2023-05-01 | Stop reason: HOSPADM

## 2023-04-30 RX ORDER — MORPHINE SULFATE 15 MG/1
15 TABLET, FILM COATED, EXTENDED RELEASE ORAL 2 TIMES DAILY
COMMUNITY
Start: 2023-04-17

## 2023-04-30 RX ORDER — SODIUM CHLORIDE 0.9 % (FLUSH) 0.9 %
10 SYRINGE (ML) INJECTION ONCE
Status: COMPLETED | OUTPATIENT
Start: 2023-04-30 | End: 2023-04-30

## 2023-04-30 RX ORDER — MORPHINE SULFATE 15 MG/1
45 TABLET, FILM COATED, EXTENDED RELEASE ORAL EVERY 12 HOURS SCHEDULED
Status: DISCONTINUED | OUTPATIENT
Start: 2023-04-30 | End: 2023-05-01 | Stop reason: HOSPADM

## 2023-04-30 RX ORDER — METOCLOPRAMIDE HYDROCHLORIDE 5 MG/ML
10 INJECTION INTRAMUSCULAR; INTRAVENOUS ONCE
Status: COMPLETED | OUTPATIENT
Start: 2023-04-30 | End: 2023-04-30

## 2023-04-30 RX ORDER — PROCHLORPERAZINE MALEATE 10 MG
TABLET ORAL
Status: ON HOLD | COMMUNITY
Start: 2023-04-18 | End: 2023-05-01

## 2023-04-30 RX ORDER — INSULIN LISPRO 100 [IU]/ML
0-8 INJECTION, SOLUTION INTRAVENOUS; SUBCUTANEOUS
Status: DISCONTINUED | OUTPATIENT
Start: 2023-04-30 | End: 2023-05-01 | Stop reason: HOSPADM

## 2023-04-30 RX ORDER — INSULIN LISPRO 100 [IU]/ML
0-4 INJECTION, SOLUTION INTRAVENOUS; SUBCUTANEOUS NIGHTLY
Status: DISCONTINUED | OUTPATIENT
Start: 2023-04-30 | End: 2023-05-01 | Stop reason: HOSPADM

## 2023-04-30 RX ORDER — ACETAMINOPHEN 650 MG/1
650 SUPPOSITORY RECTAL EVERY 6 HOURS PRN
Status: DISCONTINUED | OUTPATIENT
Start: 2023-04-30 | End: 2023-05-01 | Stop reason: HOSPADM

## 2023-04-30 RX ORDER — GABAPENTIN 300 MG/1
600 CAPSULE ORAL 2 TIMES DAILY
Status: DISCONTINUED | OUTPATIENT
Start: 2023-04-30 | End: 2023-05-01 | Stop reason: HOSPADM

## 2023-04-30 RX ORDER — ONDANSETRON 2 MG/ML
4 INJECTION INTRAMUSCULAR; INTRAVENOUS EVERY 6 HOURS PRN
Status: DISCONTINUED | OUTPATIENT
Start: 2023-04-30 | End: 2023-05-01 | Stop reason: HOSPADM

## 2023-04-30 RX ORDER — DEXTROSE MONOHYDRATE 100 MG/ML
INJECTION, SOLUTION INTRAVENOUS CONTINUOUS PRN
Status: DISCONTINUED | OUTPATIENT
Start: 2023-04-30 | End: 2023-05-01 | Stop reason: HOSPADM

## 2023-04-30 RX ORDER — SODIUM CHLORIDE 0.9 % (FLUSH) 0.9 %
5-40 SYRINGE (ML) INJECTION PRN
Status: DISCONTINUED | OUTPATIENT
Start: 2023-04-30 | End: 2023-05-01 | Stop reason: HOSPADM

## 2023-04-30 RX ORDER — 0.9 % SODIUM CHLORIDE 0.9 %
80 INTRAVENOUS SOLUTION INTRAVENOUS ONCE
Status: COMPLETED | OUTPATIENT
Start: 2023-04-30 | End: 2023-04-30

## 2023-04-30 RX ORDER — ONDANSETRON 4 MG/1
4 TABLET, ORALLY DISINTEGRATING ORAL EVERY 8 HOURS PRN
Status: DISCONTINUED | OUTPATIENT
Start: 2023-04-30 | End: 2023-05-01 | Stop reason: HOSPADM

## 2023-04-30 RX ORDER — POTASSIUM CHLORIDE 7.45 MG/ML
10 INJECTION INTRAVENOUS PRN
Status: DISCONTINUED | OUTPATIENT
Start: 2023-04-30 | End: 2023-05-01 | Stop reason: HOSPADM

## 2023-04-30 RX ORDER — OXYCODONE HYDROCHLORIDE 5 MG/1
5 TABLET ORAL EVERY 4 HOURS PRN
Status: DISCONTINUED | OUTPATIENT
Start: 2023-04-30 | End: 2023-05-01 | Stop reason: HOSPADM

## 2023-04-30 RX ORDER — LORAZEPAM 2 MG/ML
0.5 INJECTION INTRAMUSCULAR ONCE
Status: COMPLETED | OUTPATIENT
Start: 2023-04-30 | End: 2023-04-30

## 2023-04-30 RX ORDER — MORPHINE SULFATE 15 MG/1
30 TABLET, FILM COATED, EXTENDED RELEASE ORAL EVERY 12 HOURS SCHEDULED
Status: DISCONTINUED | OUTPATIENT
Start: 2023-04-30 | End: 2023-04-30

## 2023-04-30 RX ORDER — POTASSIUM CHLORIDE 20 MEQ/1
40 TABLET, EXTENDED RELEASE ORAL PRN
Status: DISCONTINUED | OUTPATIENT
Start: 2023-04-30 | End: 2023-05-01 | Stop reason: HOSPADM

## 2023-04-30 RX ORDER — MORPHINE SULFATE 30 MG/1
30 TABLET, FILM COATED, EXTENDED RELEASE ORAL 2 TIMES DAILY
COMMUNITY
Start: 2023-04-17

## 2023-04-30 RX ADMIN — OXYBUTYNIN CHLORIDE 5 MG: 5 TABLET ORAL at 14:40

## 2023-04-30 RX ADMIN — Medication 1000 MG: at 19:25

## 2023-04-30 RX ADMIN — ONDANSETRON 4 MG: 2 INJECTION INTRAMUSCULAR; INTRAVENOUS at 08:00

## 2023-04-30 RX ADMIN — ALOGLIPTIN 25 MG: 25 TABLET, FILM COATED ORAL at 14:40

## 2023-04-30 RX ADMIN — SODIUM CHLORIDE 80 ML: 9 INJECTION, SOLUTION INTRAVENOUS at 09:09

## 2023-04-30 RX ADMIN — HYDROMORPHONE HYDROCHLORIDE 1 MG: 1 INJECTION, SOLUTION INTRAMUSCULAR; INTRAVENOUS; SUBCUTANEOUS at 19:25

## 2023-04-30 RX ADMIN — LORAZEPAM 0.5 MG: 2 INJECTION INTRAMUSCULAR; INTRAVENOUS at 07:32

## 2023-04-30 RX ADMIN — OXYBUTYNIN CHLORIDE 5 MG: 5 TABLET ORAL at 20:43

## 2023-04-30 RX ADMIN — ONDANSETRON 4 MG: 2 INJECTION INTRAMUSCULAR; INTRAVENOUS at 06:59

## 2023-04-30 RX ADMIN — SERTRALINE HYDROCHLORIDE 100 MG: 100 TABLET ORAL at 14:40

## 2023-04-30 RX ADMIN — ENOXAPARIN SODIUM 30 MG: 100 INJECTION SUBCUTANEOUS at 20:43

## 2023-04-30 RX ADMIN — GABAPENTIN 600 MG: 300 CAPSULE ORAL at 20:43

## 2023-04-30 RX ADMIN — ATORVASTATIN CALCIUM 80 MG: 80 TABLET, FILM COATED ORAL at 20:43

## 2023-04-30 RX ADMIN — INSULIN LISPRO 2 UNITS: 100 INJECTION, SOLUTION INTRAVENOUS; SUBCUTANEOUS at 14:40

## 2023-04-30 RX ADMIN — MORPHINE SULFATE 45 MG: 15 TABLET, FILM COATED, EXTENDED RELEASE ORAL at 20:43

## 2023-04-30 RX ADMIN — SODIUM CHLORIDE, PRESERVATIVE FREE 10 ML: 5 INJECTION INTRAVENOUS at 19:26

## 2023-04-30 RX ADMIN — METOCLOPRAMIDE HYDROCHLORIDE 10 MG: 5 INJECTION INTRAMUSCULAR; INTRAVENOUS at 08:44

## 2023-04-30 RX ADMIN — FENTANYL CITRATE 100 MCG: 50 INJECTION, SOLUTION INTRAMUSCULAR; INTRAVENOUS at 07:32

## 2023-04-30 RX ADMIN — OXYCODONE 10 MG: 5 TABLET ORAL at 17:06

## 2023-04-30 RX ADMIN — SODIUM CHLORIDE, PRESERVATIVE FREE 10 ML: 5 INJECTION INTRAVENOUS at 09:09

## 2023-04-30 RX ADMIN — IOPAMIDOL 75 ML: 755 INJECTION, SOLUTION INTRAVENOUS at 09:09

## 2023-04-30 RX ADMIN — ENOXAPARIN SODIUM 30 MG: 100 INJECTION SUBCUTANEOUS at 12:45

## 2023-04-30 RX ADMIN — GABAPENTIN 600 MG: 300 CAPSULE ORAL at 12:45

## 2023-04-30 RX ADMIN — HYDROMORPHONE HYDROCHLORIDE 1 MG: 1 INJECTION, SOLUTION INTRAMUSCULAR; INTRAVENOUS; SUBCUTANEOUS at 10:19

## 2023-04-30 RX ADMIN — OXYCODONE 10 MG: 5 TABLET ORAL at 12:10

## 2023-04-30 ASSESSMENT — PAIN DESCRIPTION - ONSET: ONSET: ON-GOING

## 2023-04-30 ASSESSMENT — PAIN DESCRIPTION - LOCATION
LOCATION: OTHER (COMMENT)
LOCATION: RIB CAGE

## 2023-04-30 ASSESSMENT — ENCOUNTER SYMPTOMS
DIARRHEA: 0
VOMITING: 0
FACIAL SWELLING: 0
EYE DISCHARGE: 0
COUGH: 0
SHORTNESS OF BREATH: 1
COLOR CHANGE: 0
EYE REDNESS: 0
CONSTIPATION: 0
ABDOMINAL PAIN: 0

## 2023-04-30 ASSESSMENT — PAIN DESCRIPTION - ORIENTATION: ORIENTATION: RIGHT;LEFT

## 2023-04-30 ASSESSMENT — PAIN - FUNCTIONAL ASSESSMENT
PAIN_FUNCTIONAL_ASSESSMENT: 0-10
PAIN_FUNCTIONAL_ASSESSMENT: PREVENTS OR INTERFERES WITH MANY ACTIVE NOT PASSIVE ACTIVITIES

## 2023-04-30 ASSESSMENT — PAIN SCALES - GENERAL
PAINLEVEL_OUTOF10: 8
PAINLEVEL_OUTOF10: 10
PAINLEVEL_OUTOF10: 8
PAINLEVEL_OUTOF10: 10

## 2023-04-30 ASSESSMENT — PAIN DESCRIPTION - FREQUENCY: FREQUENCY: CONTINUOUS

## 2023-05-01 VITALS
DIASTOLIC BLOOD PRESSURE: 68 MMHG | WEIGHT: 265 LBS | RESPIRATION RATE: 16 BRPM | HEART RATE: 91 BPM | SYSTOLIC BLOOD PRESSURE: 133 MMHG | TEMPERATURE: 98.4 F | HEIGHT: 67 IN | OXYGEN SATURATION: 94 % | BODY MASS INDEX: 41.59 KG/M2

## 2023-05-01 PROBLEM — Z71.89 ACP (ADVANCE CARE PLANNING): Status: ACTIVE | Noted: 2023-05-01

## 2023-05-01 PROBLEM — Z71.89 GOALS OF CARE, COUNSELING/DISCUSSION: Status: ACTIVE | Noted: 2023-05-01

## 2023-05-01 PROBLEM — Z71.89 DNR (DO NOT RESUSCITATE) DISCUSSION: Status: ACTIVE | Noted: 2023-05-01

## 2023-05-01 PROBLEM — R07.9 RIGHT-SIDED CHEST PAIN: Status: ACTIVE | Noted: 2023-05-01

## 2023-05-01 LAB
ABSOLUTE EOS #: 0 K/UL (ref 0–0.4)
ABSOLUTE IMMATURE GRANULOCYTE: 0 K/UL (ref 0–0.3)
ABSOLUTE LYMPH #: 0.74 K/UL (ref 1–4.8)
ABSOLUTE MONO #: 0.53 K/UL (ref 0.2–0.8)
ANION GAP SERPL CALCULATED.3IONS-SCNC: 7 MMOL/L (ref 9–17)
BASOPHILS # BLD: 0 %
BASOPHILS ABSOLUTE: 0 K/UL (ref 0–0.2)
BUN SERPL-MCNC: 12 MG/DL (ref 8–23)
BUN/CREAT BLD: 18 (ref 9–20)
CALCIUM SERPL-MCNC: 9.4 MG/DL (ref 8.6–10.4)
CHLORIDE SERPL-SCNC: 93 MMOL/L (ref 98–107)
CO2 SERPL-SCNC: 32 MMOL/L (ref 20–31)
CREAT SERPL-MCNC: 0.65 MG/DL (ref 0.5–0.9)
EOSINOPHILS RELATIVE PERCENT: 0 % (ref 1–4)
GFR SERPL CREATININE-BSD FRML MDRD: >60 ML/MIN/1.73M2
GLUCOSE BLD-MCNC: 114 MG/DL (ref 65–105)
GLUCOSE BLD-MCNC: 147 MG/DL (ref 65–105)
GLUCOSE BLD-MCNC: 159 MG/DL (ref 65–105)
GLUCOSE SERPL-MCNC: 135 MG/DL (ref 70–99)
HCT VFR BLD AUTO: 29.8 % (ref 36.3–47.1)
HGB BLD-MCNC: 8.9 G/DL (ref 11.9–15.1)
IMMATURE GRANULOCYTES: 0 %
LYMPHOCYTES # BLD: 7 % (ref 24–44)
MCH RBC QN AUTO: 26.1 PG (ref 25.2–33.5)
MCHC RBC AUTO-ENTMCNC: 29.9 G/DL (ref 28.4–34.8)
MCV RBC AUTO: 87.4 FL (ref 82.6–102.9)
MONOCYTES # BLD: 5 % (ref 1–7)
MORPHOLOGY: ABNORMAL
MORPHOLOGY: ABNORMAL
NRBC AUTOMATED: 0 PER 100 WBC
PDW BLD-RTO: 16.2 % (ref 11.8–14.4)
PLATELET # BLD AUTO: 324 K/UL (ref 138–453)
PMV BLD AUTO: 10.1 FL (ref 8.1–13.5)
POTASSIUM SERPL-SCNC: 4.1 MMOL/L (ref 3.7–5.3)
RBC # BLD: 3.41 M/UL (ref 3.95–5.11)
SEG NEUTROPHILS: 88 % (ref 36–66)
SEGMENTED NEUTROPHILS ABSOLUTE COUNT: 9.23 K/UL (ref 1.8–7.7)
SODIUM SERPL-SCNC: 132 MMOL/L (ref 135–144)
WBC # BLD AUTO: 10.5 K/UL (ref 3.5–11.3)

## 2023-05-01 PROCEDURE — 97530 THERAPEUTIC ACTIVITIES: CPT

## 2023-05-01 PROCEDURE — 36415 COLL VENOUS BLD VENIPUNCTURE: CPT

## 2023-05-01 PROCEDURE — 97166 OT EVAL MOD COMPLEX 45 MIN: CPT

## 2023-05-01 PROCEDURE — 6360000002 HC RX W HCPCS: Performed by: INTERNAL MEDICINE

## 2023-05-01 PROCEDURE — 82947 ASSAY GLUCOSE BLOOD QUANT: CPT

## 2023-05-01 PROCEDURE — 85025 COMPLETE CBC W/AUTO DIFF WBC: CPT

## 2023-05-01 PROCEDURE — 97110 THERAPEUTIC EXERCISES: CPT

## 2023-05-01 PROCEDURE — 6370000000 HC RX 637 (ALT 250 FOR IP): Performed by: INTERNAL MEDICINE

## 2023-05-01 PROCEDURE — 2580000003 HC RX 258: Performed by: INTERNAL MEDICINE

## 2023-05-01 PROCEDURE — 97535 SELF CARE MNGMENT TRAINING: CPT

## 2023-05-01 PROCEDURE — 97162 PT EVAL MOD COMPLEX 30 MIN: CPT

## 2023-05-01 PROCEDURE — 80048 BASIC METABOLIC PNL TOTAL CA: CPT

## 2023-05-01 RX ORDER — HYDROXYZINE HYDROCHLORIDE 10 MG/1
10 TABLET, FILM COATED ORAL DAILY
COMMUNITY

## 2023-05-01 RX ORDER — LIDOCAINE AND PRILOCAINE 25; 25 MG/G; MG/G
CREAM TOPICAL PRN
COMMUNITY

## 2023-05-01 RX ORDER — FUROSEMIDE 20 MG/1
20 TABLET ORAL EVERY OTHER DAY
COMMUNITY

## 2023-05-01 RX ORDER — FLUTICASONE PROPIONATE AND SALMETEROL 100; 50 UG/1; UG/1
1 POWDER RESPIRATORY (INHALATION) 2 TIMES DAILY
COMMUNITY

## 2023-05-01 RX ORDER — ACETAMINOPHEN 500 MG
1000 TABLET ORAL EVERY 6 HOURS PRN
COMMUNITY

## 2023-05-01 RX ORDER — OXYBUTYNIN CHLORIDE 5 MG/1
5 TABLET, EXTENDED RELEASE ORAL DAILY
COMMUNITY

## 2023-05-01 RX ORDER — POLYETHYLENE GLYCOL 3350 17 G/17G
17 POWDER, FOR SOLUTION ORAL DAILY PRN
COMMUNITY

## 2023-05-01 RX ORDER — METFORMIN HYDROCHLORIDE 500 MG/1
1000 TABLET, EXTENDED RELEASE ORAL
COMMUNITY

## 2023-05-01 RX ADMIN — OXYCODONE 10 MG: 5 TABLET ORAL at 16:05

## 2023-05-01 RX ADMIN — OXYBUTYNIN CHLORIDE 5 MG: 5 TABLET ORAL at 08:57

## 2023-05-01 RX ADMIN — GABAPENTIN 600 MG: 300 CAPSULE ORAL at 08:57

## 2023-05-01 RX ADMIN — SERTRALINE HYDROCHLORIDE 100 MG: 100 TABLET ORAL at 08:56

## 2023-05-01 RX ADMIN — Medication 500 MG: at 02:54

## 2023-05-01 RX ADMIN — ENOXAPARIN SODIUM 30 MG: 100 INJECTION SUBCUTANEOUS at 08:56

## 2023-05-01 RX ADMIN — DULOXETINE HYDROCHLORIDE 30 MG: 30 CAPSULE, DELAYED RELEASE ORAL at 08:56

## 2023-05-01 RX ADMIN — Medication 1000 MG: at 12:16

## 2023-05-01 RX ADMIN — ALOGLIPTIN 25 MG: 25 TABLET, FILM COATED ORAL at 08:57

## 2023-05-01 RX ADMIN — OXYCODONE 10 MG: 5 TABLET ORAL at 02:53

## 2023-05-01 RX ADMIN — OXYCODONE 10 MG: 5 TABLET ORAL at 11:17

## 2023-05-01 RX ADMIN — MORPHINE SULFATE 45 MG: 15 TABLET, FILM COATED, EXTENDED RELEASE ORAL at 08:56

## 2023-05-01 RX ADMIN — SODIUM CHLORIDE, PRESERVATIVE FREE 10 ML: 5 INJECTION INTRAVENOUS at 09:05

## 2023-05-01 ASSESSMENT — PAIN DESCRIPTION - LOCATION
LOCATION: RIB CAGE
LOCATION: RIB CAGE
LOCATION: ABDOMEN;RIB CAGE

## 2023-05-01 ASSESSMENT — PAIN DESCRIPTION - DESCRIPTORS
DESCRIPTORS: ACHING

## 2023-05-01 ASSESSMENT — PAIN SCALES - GENERAL: PAINLEVEL_OUTOF10: 8

## 2023-05-01 NOTE — DISCHARGE SUMMARY
[facility]  Office: [address], DO, Chris Shanks, DO, Keron Laureano, DO, Nadja Skinner Blood, DO, Irving Umaña MD, Willey Cooks, MD, Keren Dickinson MD, King Allan MD,  Kamala Watson MD, Umm Ramos MD, Acacia Og DO, Mike Garcia MD,  Moriah Patrick MD, Kandi Paniagua MD, Shakeel Hall DO, Han Salas MD, Felipa Torres MD, Hilda Stratton, DO, Zack Butts MD, Angeline Stratton MD, Kenneth Spivey MD, Brionna Faustin MD,  Chiquita Torres DO, Rashid Horan MD,  Vivek Flores, CNP,  Burr Schirmer, CNP, Mat Chen, CNP, Adarsh Richardson, CNP,  Anupama Elam, [facility], Iveth Palomares, CNP, Frank Sanders, CNP, Celso Burt, CNP, Marina Guzman, CNP, Sandra Ramos, CNP, Marcin Velez PA-C, Umu Daily, CNS, Neelam Bonilla, CNP, Joe Marrow, [facility]    Discharge Summary     Patient ID: Eve Gtz  :  1960   MRN: [id number]     ACCOUNT:  [de-identified]   Patient's PCP: Kalani Espitia MD  Admit Date: 2023   Discharge Date: 2023 ***    Length of Stay: 1  Code Status:  Full Code  Admitting Physician: Carole Dobson DO  Discharge Physician: Carole Dobson DO     Active Discharge Diagnoses:     Hospital Problem Lists:  Principal Problem:    Pain due to malignant neoplasm metastatic to bone [facility])  Active Problems:    Type 2 diabetes mellitus with diabetic polyneuropathy, without long-term current use of insulin (HCC)    Primary hypertension    Normocytic anemia    Chronic obstructive pulmonary disease (HCC)    LAW (obstructive sleep apnea)    Non-small cell carcinoma of lung, right (HCC)    Cancer, metastatic to bone [facility])    Former smoker    Severe obesity (BMI >= 40) ([facility]    Goals of care, counseling/discussion    Right-sided chest pain    ACP (advance care planning)    DNR (do not resuscitate) discussion  Resolved Problems:    * No resolved

## 2023-05-01 NOTE — DISCHARGE INSTRUCTIONS
Follow up with you hematologist out patient. Follow up with radiologist out patient. Take all medications as prescribed. Call with any questions. Follow up with primary care provider as needed.

## 2023-05-01 NOTE — PLAN OF CARE
Problem: Discharge Planning  Goal: Discharge to home or other facility with appropriate resources  Outcome: Progressing  Flowsheets (Taken 4/30/2023 1925)  Discharge to home or other facility with appropriate resources:   Identify barriers to discharge with patient and caregiver   Arrange for needed discharge resources and transportation as appropriate   Identify discharge learning needs (meds, wound care, etc)     Problem: Pain  Goal: Verbalizes/displays adequate comfort level or baseline comfort level  Outcome: Progressing     Problem: Safety - Adult  Goal: Free from fall injury  Outcome: Progressing     Problem: Respiratory - Adult  Goal: Achieves optimal ventilation and oxygenation  Outcome: Progressing     Problem: Skin/Tissue Integrity - Adult  Goal: Skin integrity remains intact  Outcome: Progressing     Problem: Musculoskeletal - Adult  Goal: Return mobility to safest level of function  Outcome: Progressing     Problem: Musculoskeletal - Adult  Goal: Return ADL status to a safe level of function  Outcome: Progressing     Problem: Gastrointestinal - Adult  Goal: Minimal or absence of nausea and vomiting  Outcome: Progressing     Problem: Metabolic/Fluid and Electrolytes - Adult  Goal: Electrolytes maintained within normal limits  Outcome: Progressing     Problem: Metabolic/Fluid and Electrolytes - Adult  Goal: Hemodynamic stability and optimal renal function maintained  Outcome: Progressing     Problem: Metabolic/Fluid and Electrolytes - Adult  Goal: Glucose maintained within prescribed range  Outcome: Progressing     Problem: Coping  Goal: Pt/Family able to verbalize concerns and demonstrate effective coping strategies  Description: INTERVENTIONS:  1. Assist patient/family to identify coping skills, available support systems and cultural and spiritual values  2.  Provide emotional support, including active listening and acknowledgement of concerns of patient and caregivers  3. Reduce environmental stimuli,

## 2023-05-01 NOTE — CARE COORDINATION
Case Management Initial Discharge Plan  Emanuel Cervantes,             Met with:patient to discuss discharge plans. Information verified: address, contacts, phone number, , insurance Yes  PCP: Brittany Dumas MD  Date of last visit: 3-24-23 Telemedicine     Insurance Provider: Brenda Saldana [place] Medicaid    Discharge Planning    Living Arrangements:   son and daughter in law   Support Systems:   family    Home has 2 stories  2 stairs to climb to get into front door, unsure # of stairs to climb to reach second floor  Location of bedroom/bathroom in home  - stays on main floor    Patient able to perform ADL's:Assisted    Current Services (outpatient & in home) SN/PT  DME equipment: walker, shower chair, grab bars in shower, home O2 4L  DME provider: Jessi Payan O2 provider    Pharmacy: [facility]    Potential Assistance Needed:   Resume SN/PT    Patient agreeable to home care: Yes  Freedom of choice provided:  yes    Prior SNF/Rehab Placement and Facility: [address] [place] [address] to SNF/Rehab: No  [place] of choice provided: n/a   Evaluation: n/a    Expected Discharge date:     Patient expects to be discharged to:   home  Follow Up Appointment: Best Day/ Time:      Transportation provider: son  Transportation arrangements needed for discharge: No    Readmission Risk              Risk of Unplanned Readmission:  18             Does patient have a readmission risk score greater than 14?: Yes  If yes, follow-up appointment must be made within 7 days of discharge. Goal of Care:       Discharge Plan: Met with patient at bedside. Lives with son and daughter in law who assist pt as needed. Admitted for cancer related rib pain and SOB. Has history of lung cancer with bone mets. Currently on weekly chemo and daily radiation at [facility]. Oncologist is Dr. Brenden Hernandez. Radiation oncologist is Dr. Svitlana Laws. Has home O2 at 4L.   Palliative consulted for pain management and

## 2023-05-01 NOTE — CONSULTS
Palliative Care Inpatient Consult    NAME:  Annemarie Flores [facility] ([facility])  MEDICAL RECORD NUMBER:  [id number]  AGE: 58 y.o. GENDER: female  : 1960  TODAY'S DATE:  2023    Reasons for Consultation:    Symptom and/or pain management  Provision of information regarding PC and/or hospice philosophies  Complex, time-intensive communication and interdisciplinary psychosocial support  Clarification of goals of care and/or assistance with difficult decision-making  Guidance in regards to resources and transition(s)    Members of PC team contributing to this consultation are : Clarence Mckeon, Palliative Care APRN-CNP    Plan      Palliative Interaction: I went to see patient, and she was lying in bed awake. I introduced the palliative role to her, and she confirms that she has home palliative care with [facility]. I discussed patients cancer journey thus far, and she reports undergoing chemoradiation treatments. She reports that she was told she is a stage III and intent to be curative. She is aware of new lytic lesions that were found on CT scan. I discussed goals and patient is wanting aggressive treatment. She is wanting to continue with [facility] palliative care, but reports not having contact information to call them with uncontrolled symptoms. RN was updated and will add to DC notes. I discussed patients code status and she reports that she is currently a full code but has been talking about this with a family member that works for [employer]. She would like to remain a full code for now. I discussed HCPOA and she reports not to have one. She reports son to be decision maker and he/his wife and children live with her currently and takes her to her appointments. Patient is single and has 4 bio children that are adults. I informed her of [address] [place] [address] Dr Next of Fre[address] law and how state would look to atleast the majority of her children to make decisions. I offered to fill out HCPOA today but she declined.      I discussed

## 2023-05-01 NOTE — ACP (ADVANCE CARE PLANNING)
Advance Care Planning     Advance Care Planning (ACP) Physician/NP/PA Conversation    Date of Conversation: 4/30/2023  Conducted with: Patient with Decision Making Capacity    Healthcare Decision Maker:      Primary Decision Maker: zenaida leary - Child - [phone]    Primary Decision Maker: Tatiana Leary - Child - [phone]    Primary Decision Maker: gibran,(child #3) - Child- will need to get 3rd and 4th child's name/numbers to enter here. Click here to complete Healthcare Decision Makers including selection of the Healthcare Decision Maker Relationship (ie \"Primary\")  Today we documented Decision Maker(s) consistent with Legal Next of Kin hierarchy. Care Preferences:    Hospitalization: \"If your health worsens and it becomes clear that your chance of recovery is unlikely, what would be your preference regarding hospitalization? \"  The patient would prefer hospitalization. Ventilation: \"If you were unable to breath on your own and your chance of recovery was unlikely, what would be your preference about the use of a ventilator (breathing machine) if it was available to you? \"  The patient would desire the use of a ventilator. Resuscitation: \"In the event your heart stopped as a result of an underlying serious health condition, would you want attempts made to restart your heart, or would you prefer a natural death? \"  Yes, attempt to resuscitate.     treatment goals, benefit/burden of treatment options, ventilation preferences, hospitalization preferences, resuscitation preferences, and Palliative care     Conversation Outcomes / Follow-Up Plan:  ACP in process - information provided, considering goals and options  Reviewed DNR/DNI and patient elects Full Code (Attempt Resuscitation)    Length of Voluntary ACP Conversation in minutes:  16 minutes    Moi Draper, APRN - CNP

## 2023-05-01 NOTE — CONSULTS
[facility] Hematology and Oncology - Consult Note  5/1/2023, 11:11 AM    Admit Date: 4/30/2023  Referring Physician: Carina Corey DO   PCP: Iris Coleman MD    Reason for Consult: Lung cancer     History of Present Illness:   Jazmine Troy is a 58 y.o. female who presented to the ED for complaints of shortness of breath and poor pain control. Patient receiving daily RT, states pain got worse over night. Patient seen and examined at bedside, son present. Patient resting upon arrival, she recently received pain medications. She is maintained on 4L via NC, Afebrile, VSS, no distress noted. Oncologic history   Gabbi Bowman is an established patient of our practice for her diagnosis of non small cell lung cancer treated by Dr. Kathy Benson. RUL NSCLC diagnosed by [facility] on 2/10/23. Chemotherapy initiated 3/8 with Carboplatin/Aloxi/Taxol and started on Imfinizi on 4/19. CT chest on 3/20 showed Right apical mass increased in size from 6.1 x 5.7 x 5.3 cm, now measuring 7.3 x 7.1 x 5.3 cm. CT now measuring mass 9.0 x 7.5 x 5.5 cm     Impression/Plan    1.) NSCLC with bone mets   -Patient of Dr. Kathy Benson. -CT showed A 9 cm right lung apex mass abutting the apical pleura with lytic destructive changes in the 2nd and 3rd ribs and probably the 1st rib as well as discussed above + 5 mm posteroinferior lingular and 4 mm posterior right lower lobe solid nodules.  This is progress from previous imaging on 3/20.   -No plan for inpatient treatment at this time.   -Has RT scheduled for tomorrow and Chemotherapy Friday, can hopefully keep these appointment.   -Appointment with Dr. Kathy Benson later this week per patient   -No objection to discharge     2.) Cancer related pain   -Home pain medications ordered   -CT showed destructive changes in the ribs, Pt receiving daily RT  -Palliative care consulted, follows with [address] outpatient     3.) SOB  -Secondary to underlying RUL lung cancer, current smoker, COPD  -CT negative for PE

## 2023-05-02 NOTE — PROGRESS NOTES
Occupational Therapy  Facility/Department: [facility] MED SURG  Occupational Therapy Initial Assessment    Name: Kayla Muir  : 1960  MRN: [id number]  Date of Service: 2023    RN Alla Amaya reports patient is medically stable for therapy treatment this date. Chart reviewed prior to treatment and patient is agreeable for therapy. All lines intact and patient positioned comfortably at end of treatment. All patient needs addressed prior to ending therapy session. Discharge Recommendations:  Patient would benefit from continued therapy after discharge  Due to recent hospitalization and medical condition, pt would benefit from additional intermittent skilled therapy at time of discharge. Please refer to the AM-PAC score for current functional status. OT Equipment Recommendations  Equipment Needed: Yes  Mobility Devices: ADL Assistive Devices  ADL Assistive Devices: Emergency Alert System;Long-handled Shoe Horn;Long-handled Sponge;Reacher;Sock-Aid Hard       Patient Diagnosis(es): The primary encounter diagnosis was Malignant neoplasm of upper lobe of right lung ([address] A diagnosis of Lytic bone lesions on xray was also pertinent to this visit. Past Medical History:  has a past medical history of Anemia, Anxiety, Chronic obstructive pulmonary disease with acute exacerbation ([address] Depression, Diabetes mellitus ([address] DJD (degenerative joint disease), DM type 2, uncontrolled, with neuropathy, HTN (hypertension), Hypercholesteremia, Hyperlipidemia, Hypertension, Insomnia, Lung cancer ([address] Lung disease, LAW (obstructive sleep apnea), Pneumonia due to organism, Polyuria, Sepsis ([address] Type II or unspecified type diabetes mellitus without mention of complication, not stated as uncontrolled, and Vitamin D deficiency. Past Surgical History:  has a past surgical history that includes Tonsillectomy (); Partial hysterectomy (); Foot surgery ();  Abdominal exploration surgery; and Hand surgery
Patient discharged to home. Discharge instructions given and explained to patient, signature obtained. All patient's belongings packed and taken with patient at time of discharge. Patient stable for discharge. Patient taken to privet vehicle by wheelchair via RN. Patient sent home with home O2. Patient denies having questions at this time and was urged to call her doctors or the hospital should she have any concerns when arriving home.
Patient was resting after having a conversation with  Latesha Jovel; no family members present). Do to the patient's level of pain, writer decided to allow the patient to continue resting. Writer stated he will pray for healing, comfort, and continued rest. Writer also left a prayer card as additional support. Spiritual care will maintain daily follow ups and spiritual support as needed or requested. 05/01/23 1213   Encounter Summary   Service Provided For: Patient   Referral/Consult From: Palliative Care   Last Encounter  05/01/23   Complexity of Encounter Low   Begin Time 1130   End Time  1140   Total Time Calculated 10 min   Spiritual/Emotional needs   Type Spiritual Support   Palliative Care   Type Palliative Care, Initial/Spiritual Assessment   Assessment/Intervention/Outcome   Assessment Calm;Peaceful   Intervention Nurtured Hope;Read/Provided Scripture;Sustaining Presence/Ministry of presence   Outcome Expressed Gratitude   Plan and Referrals   Plan/Referrals Continue to visit, (comment); Continue Support (comment)
[facility]  Office: [address], DO, Carl King, DO, Lio Brain, DO, Tommy Wisdom Blood, DO, Mirlela Antoine MD, Felix Jah MD, Patricia Pino MD, Urmila Mitchell MD,  Rebecca Davies MD, Yue Martinez MD, Aeljandro Velazquez, DO, Luisa Ibarra MD,  Anselmo Ladd MD, Douglas Brown MD, Hiral Up, DO, Jocelin Mckinney MD, Eri Esparza MD, Ssuan Rubio, DO, Adela Hurtado MD, Johan Hale MD, Becky Gong MD, Kate Gill MD,  Willian Antoine, DO, Amparo Finley MD,  Moustapha Urrutia, CNP,  Marian Tanner, CNP, Ann-Marie Barreto, CNP, Rosa Maya, CNP,  Ramu Baker, [facility], Frandy Mckinley, CNP, Sruthi Silver, CNP, Zo Garcia, CNP, Laura De La Cruz, CNP, Hilario Alfaro, CNP, Lucretia Isbell PA-C, Dariusz Mcleod, CNS, Jim Medina, CNP, Mabel Robertson, [facility]    Progress Note    5/1/2023    8:44 AM    Name:   Kayla Muir  MRN:     [id number]     [place]:      [de-identified]   Room:   2012/2012-02  IP Day:  1  Admit Date:  4/30/2023  6:35 AM    PCP:    Jennifer Leon  Code Status:  Full Code    Subjective:     C/C:   Chief Complaint   Patient presents with   So Rout Shortness of Breath     Hx lung cancer/copd, actively getting chemo, report SOB onset Saturday night    Nausea     Onset Saturday morning     Interval History Status: improved. Pain is recently controlled with current medications, only used 1 dose of Dilaudid overnight. Denies any chest pain, shortness of breath, nausea or vomiting, fevers or chills. Reports her pain is relatively well controlled at this time, complains of fatigue and weakness however. Brief History: This is a 75-year-old female diagnosed with lung carcinoma in February of this year that presents with intractable upper chest pain. She has known lung cancer with direct metastasis into the second and third rib currently undergoing radiation treatment.
Transitions of Care Pharmacy Service   Medication Review    The patient's list of current home medications has been reviewed. Source(s) of information: Patient, [facility]    Based on information provided by the above source(s), I have updated the patient's home med list as described below. Please review the ACTION REQUESTED section of this note below for any discrepancies on current hospital orders. I changed or updated the following medications on the patient's home medication list:  Removed Lipitor  Celexa  Vit D  Cymbalta  Januvia  Lisinopril  Mobic  Dulera  Morphine IR  Ditropan IR  Oxy IR  Compazine  Vit B12     Added PEG  Metformin  Artificial tears  Tylenol  Albuterol HFA  Wixela  Lasix  Lidocaine patch  Emla cream  Myrbetriq  Hydroxyzine     Adjusted   Gabapentin  MS Contin  Oxy IR  Trazodone         PROVIDER ACTION REQUESTED  Medications that need to be addressed by a physician/nurse practitioner:    Medication Action Requested     - Januvia     - Lipitor     - Cymbalta     - Gabapentin     - Ditropan      (ordered as N[place]) - pt is no longer taking    - Pt is no longer taking    - pt is no longer taking    - pt takes 600mg TID    - Pt takes ER 5mg daily         Please feel free to call me with any questions about this encounter. Thank you.     Zari Fofana [facility]   Transitions of Care Pharmacy Service  Phone:  [phone]  Fax: [phone]      Electronically signed by Zari Fofana [facility] on 5/1/2023 at 6:42 PM       Medications Prior to Admission:   oxybutynin (DITROPAN-XL) 5 MG extended release tablet, Take 1 tablet by mouth daily  polyethylene glycol (MIRALAX) 17 g PACK packet, Take 17 g by mouth daily as needed (constipation)  metFORMIN (GLUCOPHAGE-XR) 500 MG extended release tablet, Take 2 tablets by mouth daily (with breakfast)  carboxymethylcellulose 1 % ophthalmic solution, Place 1 drop into both eyes as needed for Dry Eyes  acetaminophen (TYLENOL) 500 MG tablet, Take 2 tablets by mouth
assistance  Transfers  Sit to Stand: Stand by assistance  Stand to Sit: Stand by assistance  Bed to Chair: Stand by assistance  Stand Pivot Transfers: Stand by assistance  Ambulation  Surface: Level tile  Device: Rolling Walker  Assistance: Stand by assistance  Quality of Gait: gait steady; patient able to walk community distances with r.walker  Gait Deviations: Staggers  Distance: 200 ft x 1; 20 ft  x 1 w/o r.walker (one loss of balance without device)  Comments: Pt promised she would keep device with her at all times at home. Pt verbalizes she understands she is weak from being in bed for 24 hours. Balance  Posture: Good  Sitting - Static: Good  Sitting - Dynamic: Good  Standing - Static: Good;-  Standing - Dynamic: Fair;+           OutComes Score     30 sec sit to stand 6 repsm        Access Code: [email or web address]  URL: [email or web address]  Date: 05/01/2023  Prepared by: Yg Agee    Exercises  - Supine Diaphragmatic Breathing with Pelvic Floor Lengthening  - 1 x daily - 7 x weekly - 10 reps  - Supine Ankle Pumps  - 1 x daily - 7 x weekly - 10 reps  - Supine Quadricep Sets  - 1 x daily - 7 x weekly - 10 reps  - Supine Heel Slide  - 1 x daily - 7 x weekly - 10 reps  - Supine Hip Abduction  - 1 x daily - 7 x weekly - 10 reps  - Active Straight Leg Raise with Quad Set  - 1 x daily - 7 x weekly - 10 reps  - Supine Bridge  - 1 x daily - 7 x weekly - 10 reps  - Supine Posterior Pelvic Tilt  - 1 x daily - 7 x weekly - 10 reps  - Supine 90/90 Alternating Toe Touch  - 1 x daily - 7 x weekly - 10 reps  - Supine Alternating Shoulder Flexion  - 1 x daily - 7 x weekly - 10 reps  - Shoulder Flexion and Extension with Coordinated Breathing  - 1 x daily - 7 x weekly - 10 reps  - Sit to Stand  - 1 x daily - 7 x weekly - 10 reps  - Bird Dog on Counter  - 1 x daily - 7 x weekly - 10 reps  - Heel rises with counter support  - 1 x daily - 7 x weekly - 10 reps    Patient Education  - Activity and Movement     Goals  Short